# Patient Record
Sex: FEMALE | Race: WHITE | NOT HISPANIC OR LATINO | Employment: UNEMPLOYED | URBAN - METROPOLITAN AREA
[De-identification: names, ages, dates, MRNs, and addresses within clinical notes are randomized per-mention and may not be internally consistent; named-entity substitution may affect disease eponyms.]

---

## 2018-06-25 ENCOUNTER — HOSPITAL ENCOUNTER (OUTPATIENT)
Facility: MEDICAL CENTER | Age: 23
End: 2018-06-25
Payer: MEDICAID

## 2018-06-25 VITALS — HEIGHT: 65 IN | WEIGHT: 190 LBS | BODY MASS INDEX: 31.65 KG/M2

## 2018-06-25 PROCEDURE — 59025 FETAL NON-STRESS TEST: CPT | Performed by: FAMILY MEDICINE

## 2018-06-26 NOTE — PROGRESS NOTES
Pt is a 21 y/o  at 38w4d who presents with increasing contractions. No LOF, no vag bleeding, + normal fetal movement.      O:  SVE: 1/thick/high  [place]: 1 q 30 min  FHT: 140's, accels to 155, no decels    A/P  21 y/o , at 38 w 4 d, PNL wnl aside from GBS pos, O+ antibody negative, US shows posterior placenta    - BP wnl  - pt not in active labor  - can discharge home  - return precautions for increasing contractions, VB, LOF and decreased fm given

## 2018-06-26 NOTE — PROGRESS NOTES
Patient comes in with complaints of contractions over the weekend and now has pelvic pressure and pain.  Dr Grijalva called.  , edc7/5, 38.4 weeks.  Denies leaking or bleeding.  Feels fetal movement.  SVE 1/thick/high.  Patient to be sent home.  Reviewed returning for increasing contractions, leaking, bleeding, or decreased fetal movement.  Discharge instructions given.

## 2018-07-02 ENCOUNTER — HOSPITAL ENCOUNTER (INPATIENT)
Facility: MEDICAL CENTER | Age: 23
LOS: 2 days | End: 2018-07-04
Admitting: FAMILY MEDICINE
Payer: MEDICAID

## 2018-07-02 LAB
BASOPHILS # BLD AUTO: 0.6 % (ref 0–1.8)
BASOPHILS # BLD: 0.07 K/UL (ref 0–0.12)
EOSINOPHIL # BLD AUTO: 0.04 K/UL (ref 0–0.51)
EOSINOPHIL NFR BLD: 0.4 % (ref 0–6.9)
ERYTHROCYTE [DISTWIDTH] IN BLOOD BY AUTOMATED COUNT: 42.4 FL (ref 35.9–50)
HCT VFR BLD AUTO: 39.1 % (ref 37–47)
HGB BLD-MCNC: 12.6 G/DL (ref 12–16)
HOLDING TUBE BB 8507: NORMAL
IMM GRANULOCYTES # BLD AUTO: 0.04 K/UL (ref 0–0.11)
IMM GRANULOCYTES NFR BLD AUTO: 0.4 % (ref 0–0.9)
LYMPHOCYTES # BLD AUTO: 1.59 K/UL (ref 1–4.8)
LYMPHOCYTES NFR BLD: 14.2 % (ref 22–41)
MCH RBC QN AUTO: 27 PG (ref 27–33)
MCHC RBC AUTO-ENTMCNC: 32.2 G/DL (ref 33.6–35)
MCV RBC AUTO: 83.9 FL (ref 81.4–97.8)
MONOCYTES # BLD AUTO: 0.66 K/UL (ref 0–0.85)
MONOCYTES NFR BLD AUTO: 5.9 % (ref 0–13.4)
NEUTROPHILS # BLD AUTO: 8.77 K/UL (ref 2–7.15)
NEUTROPHILS NFR BLD: 78.5 % (ref 44–72)
NRBC # BLD AUTO: 0 K/UL
NRBC BLD-RTO: 0 /100 WBC
PLATELET # BLD AUTO: 177 K/UL (ref 164–446)
PMV BLD AUTO: 12.5 FL (ref 9–12.9)
RBC # BLD AUTO: 4.66 M/UL (ref 4.2–5.4)
WBC # BLD AUTO: 11.2 K/UL (ref 4.8–10.8)

## 2018-07-02 PROCEDURE — A9270 NON-COVERED ITEM OR SERVICE: HCPCS | Performed by: FAMILY MEDICINE

## 2018-07-02 PROCEDURE — 36415 COLL VENOUS BLD VENIPUNCTURE: CPT

## 2018-07-02 PROCEDURE — 304965 HCHG RECOVERY SERVICES

## 2018-07-02 PROCEDURE — 85025 COMPLETE CBC W/AUTO DIFF WBC: CPT

## 2018-07-02 PROCEDURE — 700105 HCHG RX REV CODE 258

## 2018-07-02 PROCEDURE — 700102 HCHG RX REV CODE 250 W/ 637 OVERRIDE(OP): Performed by: FAMILY MEDICINE

## 2018-07-02 PROCEDURE — 700111 HCHG RX REV CODE 636 W/ 250 OVERRIDE (IP)

## 2018-07-02 PROCEDURE — 700111 HCHG RX REV CODE 636 W/ 250 OVERRIDE (IP): Performed by: FAMILY MEDICINE

## 2018-07-02 PROCEDURE — 0KQM0ZZ REPAIR PERINEUM MUSCLE, OPEN APPROACH: ICD-10-PCS | Performed by: FAMILY MEDICINE

## 2018-07-02 PROCEDURE — 770002 HCHG ROOM/CARE - OB PRIVATE (112)

## 2018-07-02 PROCEDURE — 10907ZC DRAINAGE OF AMNIOTIC FLUID, THERAPEUTIC FROM PRODUCTS OF CONCEPTION, VIA NATURAL OR ARTIFICIAL OPENING: ICD-10-PCS | Performed by: FAMILY MEDICINE

## 2018-07-02 PROCEDURE — 59409 OBSTETRICAL CARE: CPT

## 2018-07-02 RX ORDER — MISOPROSTOL 200 UG/1
800 TABLET ORAL
Status: DISCONTINUED | OUTPATIENT
Start: 2018-07-02 | End: 2018-07-02 | Stop reason: HOSPADM

## 2018-07-02 RX ORDER — DOCUSATE SODIUM 100 MG/1
100 CAPSULE, LIQUID FILLED ORAL 2 TIMES DAILY PRN
Status: DISCONTINUED | OUTPATIENT
Start: 2018-07-02 | End: 2018-07-04 | Stop reason: HOSPADM

## 2018-07-02 RX ORDER — HYDROCODONE BITARTRATE AND ACETAMINOPHEN 5; 325 MG/1; MG/1
1 TABLET ORAL EVERY 4 HOURS PRN
Status: DISCONTINUED | OUTPATIENT
Start: 2018-07-02 | End: 2018-07-02

## 2018-07-02 RX ORDER — ACETAMINOPHEN 325 MG/1
325 TABLET ORAL EVERY 4 HOURS PRN
Status: DISCONTINUED | OUTPATIENT
Start: 2018-07-02 | End: 2018-07-04 | Stop reason: HOSPADM

## 2018-07-02 RX ORDER — HYDROCODONE BITARTRATE AND ACETAMINOPHEN 5; 325 MG/1; MG/1
2 TABLET ORAL EVERY 4 HOURS PRN
Status: DISCONTINUED | OUTPATIENT
Start: 2018-07-02 | End: 2018-07-04 | Stop reason: HOSPADM

## 2018-07-02 RX ORDER — HYDROCODONE BITARTRATE AND ACETAMINOPHEN 5; 325 MG/1; MG/1
1 TABLET ORAL EVERY 4 HOURS PRN
Status: DISCONTINUED | OUTPATIENT
Start: 2018-07-02 | End: 2018-07-04 | Stop reason: HOSPADM

## 2018-07-02 RX ORDER — HYDROXYZINE 50 MG/1
50 TABLET, FILM COATED ORAL EVERY 6 HOURS PRN
Status: DISCONTINUED | OUTPATIENT
Start: 2018-07-02 | End: 2018-07-02 | Stop reason: HOSPADM

## 2018-07-02 RX ORDER — ROPIVACAINE HYDROCHLORIDE 2 MG/ML
INJECTION, SOLUTION EPIDURAL; INFILTRATION; PERINEURAL
Status: COMPLETED
Start: 2018-07-02 | End: 2018-07-02

## 2018-07-02 RX ORDER — IBUPROFEN 600 MG/1
600 TABLET ORAL EVERY 6 HOURS PRN
Status: DISCONTINUED | OUTPATIENT
Start: 2018-07-02 | End: 2018-07-02

## 2018-07-02 RX ORDER — ONDANSETRON 2 MG/ML
4 INJECTION INTRAMUSCULAR; INTRAVENOUS EVERY 6 HOURS PRN
Status: DISCONTINUED | OUTPATIENT
Start: 2018-07-02 | End: 2018-07-04 | Stop reason: HOSPADM

## 2018-07-02 RX ORDER — FERROUS GLUCONATE 324(38)MG
324 TABLET ORAL
Status: ON HOLD | COMMUNITY
End: 2020-09-24

## 2018-07-02 RX ORDER — ACETAMINOPHEN 325 MG/1
325 TABLET ORAL EVERY 4 HOURS PRN
Status: DISCONTINUED | OUTPATIENT
Start: 2018-07-02 | End: 2018-07-02

## 2018-07-02 RX ORDER — ONDANSETRON 4 MG/1
4 TABLET, ORALLY DISINTEGRATING ORAL EVERY 6 HOURS PRN
Status: DISCONTINUED | OUTPATIENT
Start: 2018-07-02 | End: 2018-07-04 | Stop reason: HOSPADM

## 2018-07-02 RX ORDER — LIDOCAINE HYDROCHLORIDE 10 MG/ML
INJECTION, SOLUTION EPIDURAL; INFILTRATION; INTRACAUDAL; PERINEURAL
Status: COMPLETED
Start: 2018-07-02 | End: 2018-07-02

## 2018-07-02 RX ORDER — SODIUM CHLORIDE, SODIUM LACTATE, POTASSIUM CHLORIDE, CALCIUM CHLORIDE 600; 310; 30; 20 MG/100ML; MG/100ML; MG/100ML; MG/100ML
INJECTION, SOLUTION INTRAVENOUS
Status: COMPLETED
Start: 2018-07-02 | End: 2018-07-02

## 2018-07-02 RX ORDER — SODIUM CHLORIDE, SODIUM LACTATE, POTASSIUM CHLORIDE, CALCIUM CHLORIDE 600; 310; 30; 20 MG/100ML; MG/100ML; MG/100ML; MG/100ML
INJECTION, SOLUTION INTRAVENOUS CONTINUOUS
Status: DISCONTINUED | OUTPATIENT
Start: 2018-07-02 | End: 2018-07-02 | Stop reason: HOSPADM

## 2018-07-02 RX ORDER — ONDANSETRON 2 MG/ML
4 INJECTION INTRAMUSCULAR; INTRAVENOUS EVERY 6 HOURS PRN
Status: DISCONTINUED | OUTPATIENT
Start: 2018-07-02 | End: 2018-07-02

## 2018-07-02 RX ORDER — ALUMINA, MAGNESIA, AND SIMETHICONE 2400; 2400; 240 MG/30ML; MG/30ML; MG/30ML
30 SUSPENSION ORAL EVERY 6 HOURS PRN
Status: DISCONTINUED | OUTPATIENT
Start: 2018-07-02 | End: 2018-07-02 | Stop reason: HOSPADM

## 2018-07-02 RX ORDER — IBUPROFEN 600 MG/1
600 TABLET ORAL EVERY 6 HOURS PRN
Status: DISCONTINUED | OUTPATIENT
Start: 2018-07-02 | End: 2018-07-04 | Stop reason: HOSPADM

## 2018-07-02 RX ORDER — MAG HYDROX/ALUMINUM HYD/SIMETH 400-400-40
1 SUSPENSION, ORAL (FINAL DOSE FORM) ORAL
Status: DISCONTINUED | OUTPATIENT
Start: 2018-07-02 | End: 2018-07-04 | Stop reason: HOSPADM

## 2018-07-02 RX ORDER — BISACODYL 10 MG
10 SUPPOSITORY, RECTAL RECTAL PRN
Status: DISCONTINUED | OUTPATIENT
Start: 2018-07-02 | End: 2018-07-04 | Stop reason: HOSPADM

## 2018-07-02 RX ORDER — VITAMIN A ACETATE, BETA CAROTENE, ASCORBIC ACID, CHOLECALCIFEROL, .ALPHA.-TOCOPHEROL ACETATE, DL-, THIAMINE MONONITRATE, RIBOFLAVIN, NIACINAMIDE, PYRIDOXINE HYDROCHLORIDE, FOLIC ACID, CYANOCOBALAMIN, CALCIUM CARBONATE, FERROUS FUMARATE, ZINC OXIDE, CUPRIC OXIDE 3080; 12; 120; 400; 1; 1.84; 3; 20; 22; 920; 25; 200; 27; 10; 2 [IU]/1; UG/1; MG/1; [IU]/1; MG/1; MG/1; MG/1; MG/1; MG/1; [IU]/1; MG/1; MG/1; MG/1; MG/1; MG/1
1 TABLET, FILM COATED ORAL EVERY MORNING
Status: DISCONTINUED | OUTPATIENT
Start: 2018-07-03 | End: 2018-07-04 | Stop reason: HOSPADM

## 2018-07-02 RX ORDER — HYDROCODONE BITARTRATE AND ACETAMINOPHEN 10; 325 MG/1; MG/1
1 TABLET ORAL EVERY 4 HOURS PRN
Status: DISCONTINUED | OUTPATIENT
Start: 2018-07-02 | End: 2018-07-02

## 2018-07-02 RX ADMIN — Medication 2000 ML/HR: at 14:18

## 2018-07-02 RX ADMIN — SODIUM CHLORIDE, SODIUM LACTATE, POTASSIUM CHLORIDE, CALCIUM CHLORIDE: 600; 310; 30; 20 INJECTION, SOLUTION INTRAVENOUS at 11:20

## 2018-07-02 RX ADMIN — IBUPROFEN 600 MG: 600 TABLET, FILM COATED ORAL at 21:13

## 2018-07-02 RX ADMIN — FENTANYL CITRATE 100 MCG: 50 INJECTION, SOLUTION INTRAMUSCULAR; INTRAVENOUS at 14:23

## 2018-07-02 RX ADMIN — SODIUM CHLORIDE, POTASSIUM CHLORIDE, SODIUM LACTATE AND CALCIUM CHLORIDE: 600; 310; 30; 20 INJECTION, SOLUTION INTRAVENOUS at 11:20

## 2018-07-02 RX ADMIN — FENTANYL CITRATE 100 MCG: 50 INJECTION, SOLUTION INTRAMUSCULAR; INTRAVENOUS at 13:13

## 2018-07-02 RX ADMIN — LIDOCAINE HYDROCHLORIDE 30 ML: 10 INJECTION, SOLUTION EPIDURAL; INFILTRATION; INTRACAUDAL; PERINEURAL at 14:33

## 2018-07-02 RX ADMIN — HYDROCODONE BITARTRATE AND ACETAMINOPHEN 1 TABLET: 5; 325 TABLET ORAL at 19:40

## 2018-07-02 RX ADMIN — HYDROCODONE BITARTRATE AND ACETAMINOPHEN 1 TABLET: 10; 325 TABLET ORAL at 14:56

## 2018-07-02 RX ADMIN — Medication 125 ML/HR: at 15:36

## 2018-07-02 RX ADMIN — FENTANYL CITRATE 100 MCG: 50 INJECTION, SOLUTION INTRAMUSCULAR; INTRAVENOUS at 11:48

## 2018-07-02 RX ADMIN — IBUPROFEN 600 MG: 600 TABLET, FILM COATED ORAL at 14:54

## 2018-07-02 ASSESSMENT — PATIENT HEALTH QUESTIONNAIRE - PHQ9
1. LITTLE INTEREST OR PLEASURE IN DOING THINGS: NOT AT ALL
SUM OF ALL RESPONSES TO PHQ9 QUESTIONS 1 AND 2: 0
1. LITTLE INTEREST OR PLEASURE IN DOING THINGS: NOT AT ALL
2. FEELING DOWN, DEPRESSED, IRRITABLE, OR HOPELESS: NOT AT ALL
SUM OF ALL RESPONSES TO PHQ9 QUESTIONS 1 AND 2: 0

## 2018-07-02 ASSESSMENT — COPD QUESTIONNAIRES
COPD SCREENING SCORE: 0
DO YOU EVER COUGH UP ANY MUCUS OR PHLEGM?: NO/ONLY WITH OCCASIONAL COLDS OR INFECTIONS
HAVE YOU SMOKED AT LEAST 100 CIGARETTES IN YOUR ENTIRE LIFE: NO/DON'T KNOW
IN THE PAST 12 MONTHS DO YOU DO LESS THAN YOU USED TO BECAUSE OF YOUR BREATHING PROBLEMS: DISAGREE/UNSURE
DURING THE PAST 4 WEEKS HOW MUCH DID YOU FEEL SHORT OF BREATH: NONE/LITTLE OF THE TIME

## 2018-07-02 ASSESSMENT — PAIN SCALES - GENERAL
PAINLEVEL_OUTOF10: 8
PAINLEVEL_OUTOF10: 5
PAINLEVEL_OUTOF10: 5
PAINLEVEL_OUTOF10: 8
PAINLEVEL_OUTOF10: 7

## 2018-07-02 ASSESSMENT — LIFESTYLE VARIABLES
ALCOHOL_USE: NO
EVER_SMOKED: NEVER

## 2018-07-02 NOTE — PROGRESS NOTES
Pt arrived via wheel chair with belongings to room S323.  Report received from Srinivasan.  Pt oriented to room, call light & infant security.  Assessment done.  Fundus firm, lochia light.  Vital signs stable.  IV patent on pump.  Pt denies complaints of pain, see MAR.  Pt aware of dangers related to sleeping with infant, reviewed plan of care with pt & encouraged to call with needs or prior to ambulating.  Call light in place.

## 2018-07-02 NOTE — PROGRESS NOTES
"S:  Patient feeling uncomfortable with contractions.  Currently getting fentanyl pushes with good effect.  Reporting 7/10 pain between contractions and 10/10 pain with contractions.  No desire for epidural at this time.    O:  Vitals:    18 0948 18 1107   BP: 117/73 116/69   Pulse: 86 90   Resp:  18   Temp: 36.9 °C (98.5 °F) 36.9 °C (98.4 °F)   TempSrc: Temporal Temporal   Weight: 88.5 kg (195 lb) 84.8 kg (187 lb)   Height: 1.651 m (5' 5\") 1.651 m (5' 5\")   FHT: 140s +accels, -decels, moderate variability  [place]: Q5 minutes, not clearly picked up on monitor  SVE: 7/100%/0    A/P:   at 39w4d weeks in active labor here for   -AROM @1:45pm--clear  -GBS unknown--GBS PCR ordered and pending.  Low risk  -Epidural or Fentanyl PRN  -Anticipating         "

## 2018-07-02 NOTE — DELIVERY NOTE
[facility] SPONTANEOUS VAGINAL DELIVERY PRODEDURE NOTE    PATIENT ID:  NAME:  Gayle Washington  MRN:               [id number]  YOB: 1995    On 2018  at 14:13, this 22 y.o., now  39w4d , GBS unknown female delivered via  under no anesthesia a viable female infant weighing 6 lbs and 11 oz with APGAR scores of 8 and 9 at one and five minutes. There was a single nuchal cord which was reduced and infant was bulb suctioned at delivery. Delayed cord clamping was allowed and then cord was doubly clamped, cut and infant handed to RN in attendance. An intact placenta was delivered spontaneously at 14:18 with 3 vessel cord. Upon vaginal exam, there was a second degree perineal laceration which was repaired using 3.0 vicryl in the usual sterile fashion. Estimated blood loss was 300cc. Patient will be transferred to postpartum in stable condition and infant to  nursery.    Delivery attended by Dr. Cabrales who was present for the entire delivery.

## 2018-07-02 NOTE — H&P
[facility] LABOR AND DELIVERY HISTORY AND PHYSICAL    PATIENT ID:  NAME:  Gayle Washington  MRN:               [id number]  YOB: 1995    CC:  Latent Labor    HPI:  Gayle Washington is a 22 y.o. female  at 39w4d by a 9 week ultrasound consistent w/ LMP. No LMP recorded. Patient is pregnant.. Estimated Date of Delivery: 18  Patient presents complaining of  uterine contractions, with no loss of fluid.  Good fetal movement.  No vaginal bleeding.  Pregnancy was uncomplicated.     ROS: Patient denies any fever chills, nausea, vomiting, headache, chest pain, shortness of breath, or dysuria or unusual swelling of hands or feet.     Prenatal Care: Obtained at [facility] with Dr. Gautam. Weight gain was appropriate.     Prenatal Labs:   HepBsAg: Neg HIV: Neg Rubella: Imm   RPR: Neg PAP: Uknown GBS: Unkown   GC/CT: Unknown O+/ Ab Neg Quad Screen: Unknown   No results for input(s): WBC, RBC, HEMOGLOBIN, HEMATOCRIT, MCV, MCH, RDW, PLATELETCT, MPV, NEUTSPOLYS, LYMPHOCYTES, MONOCYTES, EOSINOPHILS, BASOPHILS, RBCMORPHOLO in the last 72 hours.  No results for input(s): SODIUM, POTASSIUM, CHLORIDE, CO2, GLUCOSE, BUN, CPKTOTAL in the last 72 hours.       IMAGINwk US  Anatomy scan     US shows posterior placenta    POB Hx:  OB History    Para Term  AB Living   2 1       1   SAB TAB Ectopic Molar Multiple Live Births           1 1      # Outcome Date GA Lbr Rito/2nd Weight Sex Delivery Anes PTL Lv   2 Current            1A Term 07/12/15 39w6d  4.03 kg (8 lb 14.2 oz) M Vag-Spont EPI N NAZ   1B                    PMH/Problem List:    No past medical history on file.  There are no active problems to display for this patient.      Current Outpatient Medications:  No current facility-administered medications on file prior to encounter.      Current Outpatient Prescriptions on File Prior to Encounter   Medication Sig Dispense Refill   • Prenatal MV-Min-Fe Fum-FA-DHA (PRENATAL 1 PO) Take  by  "mouth.         PSH:    Past Surgical History:   Procedure Laterality Date   • MINDA BY LAPAROSCOPY  2013    Performed by Robin Dash M.D. at [facility]       Allergies:   No Known Allergies    SH:  Social History     Social History   • Marital status: Single     Spouse name: N/A   • Number of children: N/A   • Years of education: N/A     Occupational History   • Not on file.     Social History Main Topics   • Smoking status: Never Smoker   • Smokeless tobacco: Not on file   • Alcohol use No   • Drug use: No   • Sexual activity: Yes     Partners: Male      Comment: Unplanned pregnancy     Other Topics Concern   • Not on file     Social History Narrative   • No narrative on file         PHYSICAL EXAM:  Vitals:    18 0948   BP: 117/73   Pulse: 86   Temp: 36.9 °C (98.5 °F)   TempSrc: Temporal   Weight: 88.5 kg (195 lb)   Height: 1.651 m (5' 5\")     Temp (24hrs), Av.9 °C (98.5 °F), Min:36.9 °C (98.5 °F), Max:36.9 °C (98.5 °F)    General: No acute distress, resting comfortably in bed.  HEENT: normocephalic, nontraumatic, PERRLA, EOMI  Cardiovascular: Heart RRR with no murmurs, rubs or gallops. Distal Pulses 2+  Respiratory: symmetric chest expansion, lungs CTA bilaterally with no wheezes rales or  rhonci  Abdomen: gravid, nontender  Musculoskeletal: strength 5/5 in four extremities  Neuro: non focal with no numbness, tingling or changes in sensation    SVE: 5/80%/-2  [place]: Q6 minutes; EFM: 130s with accels to 140s    A/P: Intrauterine pregancy at 39w4d weeks in latent labor here for .    1. IUP at term  2. Patient is GBS unknown  1. Will order GBS   2. Pt is low risk, will not start abx at this time  3. Anticipating   4. EFW: 3100g    "

## 2018-07-02 NOTE — PROGRESS NOTES
MARITZA , EGA 39.4    Positive FM, denies LOF or VB.    Pt presenting with CO UC's Q6 mins since 5am.  She stated she was seen last week and was 1cm.    SVE /2    1000 Call to [facility] exchange.    1003 Call back from Aminta. MD will come to BS.    1010 Alcides at BS, SVE preformed.    1040 Alcides in department, will admit PT    1050 Report to Hillary,

## 2018-07-02 NOTE — PROGRESS NOTES
[id number]. Report from Frances GILBERT RN. POC discussed and resumed. Pt to S225.    1220. Dr. Gautam and Dr. Cabrales notified of unknown GBS, No new orders received.Dr. Gautam and Dr. Cabrales at the bedside.     1346. Dr. Gautam at the bedside. SVE 7/100/0, AROM clear fluid.    1413. SCD viable female, 8/9 APGARS, 2nd degree lac with repair.    1615. Pt up to void, voided. Joan care done.    1620. Pt transferred to  in stable condition. Report to Rachna DÍAZ RN. POC discussed at the bedside.

## 2018-07-03 LAB
ERYTHROCYTE [DISTWIDTH] IN BLOOD BY AUTOMATED COUNT: 41.3 FL (ref 35.9–50)
HCT VFR BLD AUTO: 31.8 % (ref 37–47)
HGB BLD-MCNC: 10.4 G/DL (ref 12–16)
MCH RBC QN AUTO: 27.4 PG (ref 27–33)
MCHC RBC AUTO-ENTMCNC: 32.7 G/DL (ref 33.6–35)
MCV RBC AUTO: 83.7 FL (ref 81.4–97.8)
PLATELET # BLD AUTO: 158 K/UL (ref 164–446)
PMV BLD AUTO: 13.1 FL (ref 9–12.9)
RBC # BLD AUTO: 3.8 M/UL (ref 4.2–5.4)
WBC # BLD AUTO: 12.1 K/UL (ref 4.8–10.8)

## 2018-07-03 PROCEDURE — A9270 NON-COVERED ITEM OR SERVICE: HCPCS | Performed by: FAMILY MEDICINE

## 2018-07-03 PROCEDURE — 85027 COMPLETE CBC AUTOMATED: CPT

## 2018-07-03 PROCEDURE — 700112 HCHG RX REV CODE 229: Performed by: FAMILY MEDICINE

## 2018-07-03 PROCEDURE — 36415 COLL VENOUS BLD VENIPUNCTURE: CPT

## 2018-07-03 PROCEDURE — 700102 HCHG RX REV CODE 250 W/ 637 OVERRIDE(OP): Performed by: FAMILY MEDICINE

## 2018-07-03 PROCEDURE — 770002 HCHG ROOM/CARE - OB PRIVATE (112)

## 2018-07-03 RX ORDER — FERROUS SULFATE 325(65) MG
325 TABLET ORAL 2 TIMES DAILY WITH MEALS
Status: DISCONTINUED | OUTPATIENT
Start: 2018-07-03 | End: 2018-07-04 | Stop reason: HOSPADM

## 2018-07-03 RX ADMIN — Medication 1 TABLET: at 07:06

## 2018-07-03 RX ADMIN — DOCUSATE SODIUM 100 MG: 100 CAPSULE ORAL at 22:28

## 2018-07-03 RX ADMIN — IBUPROFEN 600 MG: 600 TABLET, FILM COATED ORAL at 22:28

## 2018-07-03 RX ADMIN — IBUPROFEN 600 MG: 600 TABLET, FILM COATED ORAL at 07:06

## 2018-07-03 RX ADMIN — Medication 325 MG: at 18:33

## 2018-07-03 RX ADMIN — HYDROCODONE BITARTRATE AND ACETAMINOPHEN 2 TABLET: 5; 325 TABLET ORAL at 12:08

## 2018-07-03 RX ADMIN — HYDROCODONE BITARTRATE AND ACETAMINOPHEN 1 TABLET: 5; 325 TABLET ORAL at 22:28

## 2018-07-03 ASSESSMENT — PAIN SCALES - GENERAL
PAINLEVEL_OUTOF10: 6
PAINLEVEL_OUTOF10: 7
PAINLEVEL_OUTOF10: 4
PAINLEVEL_OUTOF10: 0
PAINLEVEL_OUTOF10: 2

## 2018-07-03 NOTE — CARE PLAN
Problem: Safety  Goal: Will remain free from falls  Outcome: PROGRESSING AS EXPECTED  Patient resting in bed with call light in reach. Bed in low position.     Problem: Knowledge Deficit  Goal: Knowledge of disease process/condition, treatment plan, diagnostic tests, and medications will improve  Outcome: PROGRESSING AS EXPECTED  Patient educated on PP floor. Patient verbalizes understanding of processes at this time.

## 2018-07-03 NOTE — PROGRESS NOTES
[facility] POSTPARTUM PROGRESS NOTE    PATIENT ID:  NAME:  Gayle Washington  MRN:               [id number]  YOB: 1995     22 y.o. female  at 39w5d PPD#1 s/p     Subjective: Doing well this morning.  Lochia light.  Some cramping overnight, but pain well controlled.  No SOB or leg swelling.  Breast feeding going well.  Does not desire depo shot in the hospital.      Objective:    Vitals:    18 1524 18 1630 18 0000   BP: 118/68 106/61 109/75 104/69   Pulse: 67 65 66 67   Resp:     Temp:  36.7 °C (98 °F) 36.9 °C (98.4 °F) 36.8 °C (98.3 °F)   TempSrc:       SpO2:  94% 95% 94%   Weight:       Height:         General: No acute distress, resting comfortably in bed.  HEENT: normocephalic, nontraumatic, PERRLA, EOMI  Cardiovascular: Heart RRR with no murmurs, rubs or gallops. Distal Pulses 2+  Respiratory: symmetric chest expansion, lungs CTA bilaterally with no wheezes rales or rhonci  Abdomen: soft, mildly tender, fundus firm, +BS  Genitourinary: lochia light, denies excessive vaginal bleeding  Musculoskeletal: No edema in LE  Neuro: non focal with no numbness, tingling or changes in sensation    Recent Labs      18   1110  18   0348   WBC  11.2*  12.1*   RBC  4.66  3.80*   HEMOGLOBIN  12.6  10.4*   HEMATOCRIT  39.1  31.8*   MCV  83.9  83.7   MCH  27.0  27.4   RDW  42.4  41.3   PLATELETCT  177  158*   MPV  12.5  13.1*   NEUTSPOLYS  78.50*   --    LYMPHOCYTES  14.20*   --    MONOCYTES  5.90   --    EOSINOPHILS  0.40   --    BASOPHILS  0.60   --      No results for input(s): SODIUM, POTASSIUM, CHLORIDE, CO2, GLUCOSE, BUN, CPKTOTAL in the last 72 hours.    Current Meds:   Current Facility-Administered Medications   Medication Dose Frequency Provider Last Rate Last Dose   • ondansetron (ZOFRAN ODT) dispertab 4 mg  4 mg Q6HRS PRN Meeta Mcgrath M.D.       • oxytocin (PITOCIN) infusion (for postpartum)   mL/hr Continuous Meeta Mcgrath M.D.       •  oxytocin (PITOCIN) infusion (for postpartum)   mL/hr Continuous Erinn Gautam M.D. 125 mL/hr at 18 1536 125 mL/hr at 18 1536   • ibuprofen (MOTRIN) tablet 600 mg  600 mg Q6HRS PRN Erinn Gautam M.D.   600 mg at 18 0706   • acetaminophen (TYLENOL) tablet 325 mg  325 mg Q4HRS PRN Erinn Gautam M.D.       • HYDROcodone-acetaminophen (NORCO) 5-325 MG per tablet 1 Tab  1 Tab Q4HRS PRDIEGO Gautam M.D.   1 Tab at 18 1940   • HYDROcodone-acetaminophen (NORCO) 5-325 MG per tablet 2 Tab  2 Tab Q4HRS PRN Erinn Gautam M.D.       • docusate sodium (COLACE) capsule 100 mg  100 mg BID PRN Erinn Gautam M.D.       • ondansetron (ZOFRAN) syringe/vial injection 4 mg  4 mg Q6HRS PRN Erinn Gautam M.D.       • prenatal plus vitamin (STUARTNATAL 1+1) 27-1 MG tablet 1 Tab  1 Tab QAM Erinn Gautam M.D.   1 Tab at 18 0706   • bisacodyl (DULCOLAX) suppository 10 mg  10 mg PRDIEGO Gautam M.D.       • glycerin (adult) suppository 1 Suppository  1 Suppository QDAY PRN Erinn Gautam M.D.         Last reviewed on 2018 11:13 AM by Lien Grey R.N.     Assessment:  22 y.o. female  at 39w5d PPD#1 s/p     Plan:   1. Routine post partum care  2. Patient does not desire depo shot at this time  3. Will start iron supplement  4. Disposition: Anticipate discharge tomorrow for GBS positive and no antibiotics  5. Followup: [facility] with Dr. Gautam

## 2018-07-03 NOTE — CONSULTS
Lactation visit done. Pt states she is not having any trouble with breastfeeding, denies nipple pain or tenderness. Discussed resources for breastfeeding help post d/c.  Discussed feeding frequency norms for next 3 days. Enc to call for latch check with next feeding as needed.

## 2018-07-03 NOTE — PROGRESS NOTES
1900: Patient resting in bed with call light in reach. Bed in low position. FOB at bedside.   0700: Report given to day RN. Plan of care discussed.

## 2018-07-03 NOTE — CARE PLAN
Problem: Potential for postpartum infection related to presence of episiotomy/vaginal tear and/or uterine contamination  Goal: Patient will be absent from signs and symptoms of infection  Outcome: PROGRESSING AS EXPECTED  No signs or symptoms of infection noted    Problem: Alteration in comfort related to episiotomy, vaginal repair and/or after birth pains  Goal: Patient is able to ambulate, care for self and infant  Outcome: PROGRESSING AS EXPECTED  Taking pain meds states adequate relief

## 2018-07-04 VITALS
OXYGEN SATURATION: 95 % | TEMPERATURE: 97.6 F | DIASTOLIC BLOOD PRESSURE: 67 MMHG | HEART RATE: 70 BPM | RESPIRATION RATE: 20 BRPM | BODY MASS INDEX: 31.16 KG/M2 | WEIGHT: 187 LBS | HEIGHT: 65 IN | SYSTOLIC BLOOD PRESSURE: 114 MMHG

## 2018-07-04 PROCEDURE — 700102 HCHG RX REV CODE 250 W/ 637 OVERRIDE(OP): Performed by: FAMILY MEDICINE

## 2018-07-04 PROCEDURE — 700112 HCHG RX REV CODE 229: Performed by: FAMILY MEDICINE

## 2018-07-04 PROCEDURE — A9270 NON-COVERED ITEM OR SERVICE: HCPCS | Performed by: FAMILY MEDICINE

## 2018-07-04 RX ORDER — IBUPROFEN 600 MG/1
600 TABLET ORAL EVERY 6 HOURS PRN
Qty: 30 TAB | Refills: 2 | Status: ON HOLD | OUTPATIENT
Start: 2018-07-04 | End: 2020-09-24

## 2018-07-04 RX ORDER — HYDROCODONE BITARTRATE AND ACETAMINOPHEN 5; 325 MG/1; MG/1
1 TABLET ORAL EVERY 4 HOURS PRN
Qty: 15 TAB | Refills: 0 | Status: SHIPPED | OUTPATIENT
Start: 2018-07-04 | End: 2018-07-07

## 2018-07-04 RX ADMIN — DOCUSATE SODIUM 100 MG: 100 CAPSULE ORAL at 08:12

## 2018-07-04 RX ADMIN — IBUPROFEN 600 MG: 600 TABLET, FILM COATED ORAL at 06:07

## 2018-07-04 RX ADMIN — Medication 325 MG: at 08:12

## 2018-07-04 RX ADMIN — HYDROCODONE BITARTRATE AND ACETAMINOPHEN 1 TABLET: 5; 325 TABLET ORAL at 06:07

## 2018-07-04 RX ADMIN — Medication 1 TABLET: at 08:12

## 2018-07-04 ASSESSMENT — PAIN SCALES - GENERAL
PAINLEVEL_OUTOF10: 0
PAINLEVEL_OUTOF10: 0
PAINLEVEL_OUTOF10: 4

## 2018-07-04 NOTE — CARE PLAN
Problem: Altered physiologic condition related to immediate post-delivery state and potential for bleeding/hemorrhage  Goal: Patient physiologically stable as evidenced by normal lochia, palpable uterine involution and vital signs within normal limits  Outcome: PROGRESSING AS EXPECTED  Fundus firm, lochia light, VSS.    Problem: Alteration in comfort related to episiotomy, vaginal repair and/or after birth pains  Goal: Patient is able to ambulate, care for self and infant  Outcome: PROGRESSING AS EXPECTED  Pt ambulating and caring for self and infant independently, taking po pain medication as needed.

## 2018-07-04 NOTE — PROGRESS NOTES
Pt assessment complete, wnl.  Pt ambulating and voiding without difficulty.  Bonding well with infant, breast feeding independently.  Plan of care discussed with patient for the night.  Pt will call RN if she needs pain medication this shift.  Questions answered.  FOB at bedside and supportive.  Encouraged to call with needs, call light in reach.

## 2018-07-04 NOTE — PROGRESS NOTES
Wheeled out by RN for discharge.  Baby in Car Seat. Accompanied by FOB  And discharge to home with all belongings.

## 2018-07-04 NOTE — DISCHARGE INSTRUCTIONS
POSTPARTUM DISCHARGE INSTRUCTIONS FOR MOM    YOB: 1995   Age: 22 y.o.               Admit Date: 2018     Discharge Date: 2018  Attending Doctor:  Jill Zaragoza                  Allergies:  Patient has no known allergies.    Discharged to home by car. Discharged via wheelchair, hospital escort: Yes.  Special equipment needed: Not Applicable  Belongings with: Personal  Be sure to schedule a follow-up appointment with your primary care doctor or any specialists as instructed.     Discharge Plan:   Diet Plan: Discussed  Activity Level: Discussed  Confirmed Follow up Appointment: Patient to Call and Schedule Appointment  Confirmed Symptoms Management: Discussed  Medication Reconciliation Updated: No (Comments)  Influenza Vaccine Indication: Not indicated: Previously immunized this influenza season and > 8 years of age    REASONS TO CALL YOUR OBSTETRICIAN:  1.   Persistent fever or shaking chills (Temperature higher than 100.4)  2.   Heavy bleeding (soaking more than 1 pad per hour); Passing clots  3.   Foul odor from vagina  4.   Mastitis (Breast infection; breast pain, chills, fever, redness)  5.   Urinary pain, burning or frequency  6.   Episiotomy infection  7.   Abdominal incision infection  8.   Severe depression longer than 24 hours    HAND WASHING  · Prior to handling the baby.  · Before breastfeeding or bottle feeding baby.  · After using the bathroom or changing the baby's diaper.    WOUND CARE  Ask your physician for additional care instructions.  In general:    ·  Incision:      · Keep clean and dry.    · Do NOT lift anything heavier than your baby for up to 6 weeks.    · There should not be any opening or pus.      VAGINAL CARE  · Nothing inside vagina for 6 weeks: no sexual intercourse, tampons or douching.  · Bleeding may continue for 2-4 weeks.  Amount may vary.    · Call your physician for heavy bleeding which means soaking more than 1 pad per hour    BIRTH CONTROL  · It is  "possible to become pregnant at any time after delivery and while breastfeeding.  · Plan to discuss a method of birth control with your physician at your follow up visit. visit.    DIET AND ELIMINATION  · Eating more fiber (bran cereal, fruits, and vegetables) and drinking plenty of fluids will help to avoid constipation.  · Urinary frequency after childbirth is normal.    POSTPARTUM BLUES  During the first few days after birth, you may experience a sense of the \"blues\" which may include impatience, irritability or even crying.  These feeling come and go quickly.  However, as many as 1 in 10 women experience emotional symptoms known as postpartum depression.    Postpartum depression:  May start as early as the second or third day after delivery or take several weeks or months to develop.  Symptoms of \"blues\" are present, but are more intense:  Crying spells; loss of appetite; feelings of hopelessness or loss of control; fear of touching the baby; over concern or no concern at all about the baby; little or no concern about your own appearance/caring for yourself; and/or inability to sleep or excessive sleeping.  Contact your physician if you are experiencing any of these symptoms.    Crisis Hotline:  · [place] Crisis Hotline:  7-344-UFXZYPL  Or 1-453.159.9947  · [place] Crisis Hotline:  1-347.787.3690  Or 975-153-6810    PREVENTING SHAKEN BABY:  If you are angry or stressed, PUT THE BABY IN THE CRIB, step away, take some deep breaths, and wait until you are calm to care for the baby.  DO NOT SHAKE THE BABY.  You are not alone, call a supporter for help.    · Crisis Call Center 24/7 crisis line 263-807-4705 or 1-754.563.2017  · You can also text them, text \"ANSWER\" to 118762    QUIT SMOKING/TOBACCO USE:  I understand the use of any tobacco products increases my chance of suffering from future heart disease and could cause other illnesses which may shorten my life. Quitting the use of tobacco products is the single most " important thing I can do to improve my health. For further information on smoking / tobacco cessation call a Toll Free Quit Line at 1-576.544.6507 (*National [employer] [place]) or 1-255.192.2565 (American Lung Association) or you can access the web based program at www.lungusa.org.    · [place] Tobacco Users Help Line:  (226) 435-2147       Toll Free: 1-388.404.9361  · Quit Tobacco Program [facility] (060)190-7679    DEPRESSION / SUICIDE RISK:  As you are discharged from this [facility], it is important to learn how to keep safe from harming yourself.    Recognize the warning signs:  · Abrupt changes in personality, positive or negative- including increase in energy   · Giving away possessions  · Change in eating patterns- significant weight changes-  positive or negative  · Change in sleeping patterns- unable to sleep or sleeping all the time   · Unwillingness or inability to communicate  · Depression  · Unusual sadness, discouragement and loneliness  · Talk of wanting to die  · Neglect of personal appearance   · Rebelliousness- reckless behavior  · Withdrawal from people/activities they love  · Confusion- inability to concentrate     If you or a loved one observes any of these behaviors or has concerns about self-harm, here's what you can do:  · Talk about it- your feelings and reasons for harming yourself  · Remove any means that you might use to hurt yourself (examples: pills, rope, extension cords, firearm)  · Get professional help from the community (Mental Health, Substance Abuse, psychological counseling)  · Do not be alone:Call your Safe Contact- someone whom you trust who will be there for you.  · Call your local CRISIS HOTLINE 785-6166 or 296-396-7930  · Call your local Children's Mobile Crisis Response Team [place] (208) 643-7145 or www.IntroMaps  · Call the toll free National Suicide Prevention Hotlines   · National Suicide Prevention Lifeline 734-952-COCN  (8364)  · [facility] 800-SUICIDE (113-2313)    DISCHARGE SURVEY:  Thank you for choosing [facility].  We hope we provided you with very good care.  You may be receiving a survey in the mail.  Please fill it out.  Your opinion is valuable to us.    ADDITIONAL EDUCATIONAL MATERIALS GIVEN TO PATIENT:        My signature on this form indicates that:  1.  I have reviewed and understand the above information  2.  My questions regarding this information have been answered to my satisfaction.  3.  I have formulated a plan with my discharge nurse to obtain my prescribed medication for home.

## 2018-07-04 NOTE — CONSULTS
Mother reports right nipple tenderness with breastfeeding. Nipple intact. Baby to rt breast, shallow latch, mother unlatches and repositions nipple to nose and deep, more comfortable  latch obtained.  Reviewed post d/c breastfeeding support resources.  Enc to feed on hunger cue without time restrictions.

## 2018-07-04 NOTE — PROGRESS NOTES
0945- Discharge education discussed with patient.  Patient verbalized understanding.  Prescriptions handed to patient.  Patient verbalized understanding of follow up appointments for herself and infant.

## 2018-07-04 NOTE — DISCHARGE SUMMARY
[facility]  NORMAL SPONTANEOUS VAGINAL DISCHARGE SUMMARY    PATIENT ID:  NAME:  Gayle Washington  MRN:               [id number]  YOB: 1995    DATE OF ADMISSION: 2018    DATE OF DISCHARGE: 2018     ADMITTING DIAGNOSIS:  1. Intrauterine pregnancy at 39w6d.  2.GBS positive, no antibiotics    DISCHARGE DIAGNOSIS:  1. s/p       HOSPITAL COURSE: This is a 22 y.o. year old female admitted at 39w6d who presented with contractions, no LOF, no vaginal bleeding, good FM and in active labor. Pt was 5 cm dilated, 80% effaced and at  -2 station on sterile vaginal exam. Pregnancy was uncomplicated. The patient had a good labor pattern after admission and proceeded to deliver a viable female infant weighing  6 lbs and 11 oz. Infants Apgars scores were 8 and 9 at one and five minutes. The patients postpartum course was uncomplicated and she was discharged home in stable condition on postpartum day #2.    PROCEDURES PERFORMED: Normal spontaneous vaginal delivery over 2nd degree perineal laceration  which was repaired using 3-0 vicryl in the usual sterile fashion.    COMPLICATIONS: None    DIET: Regular    ACTIVITY: No intercourse and nothing inserted into the vagina for 5 weeks.    MEDICATIONS:  Norco  Ibuprofen  Iron Sulfate      FOLLOWUP:  1)  at [facility] in 5 weeks  2) Return to the hospital if copious vaginal bleeding or foul smelling discharge is noted

## 2020-04-09 LAB
ABO GROUP BLD: NORMAL
BLD GP AB SCN SERPL QL: NEGATIVE
HBV SURFACE AG SERPL QL IA: NEGATIVE
HIV 1+2 AB+HIV1 P24 AG SERPL QL IA: NON REACTIVE
RH BLD: POSITIVE
RUBV IGG SERPL IA-ACNC: NORMAL
TREPONEMA PALLIDUM IGG+IGM AB [PRESENCE] IN SERUM OR PLASMA BY IMMUNOASSAY: NON REACTIVE

## 2020-09-01 LAB — STREP GP B DNA PCR: POSITIVE

## 2020-09-23 ENCOUNTER — HOSPITAL ENCOUNTER (OUTPATIENT)
Dept: OBGYN | Facility: MEDICAL CENTER | Age: 25
End: 2020-09-23
Attending: OBSTETRICS & GYNECOLOGY
Payer: MEDICAID

## 2020-09-23 LAB
COVID ORDER STATUS COVID19: NORMAL
SARS-COV-2 RNA RESP QL NAA+PROBE: NOTDETECTED
SPECIMEN SOURCE: NORMAL

## 2020-09-23 PROCEDURE — U0003 INFECTIOUS AGENT DETECTION BY NUCLEIC ACID (DNA OR RNA); SEVERE ACUTE RESPIRATORY SYNDROME CORONAVIRUS 2 (SARS-COV-2) (CORONAVIRUS DISEASE [COVID-19]), AMPLIFIED PROBE TECHNIQUE, MAKING USE OF HIGH THROUGHPUT TECHNOLOGIES AS DESCRIBED BY CMS-2020-01-R: HCPCS

## 2020-09-23 PROCEDURE — C9803 HOPD COVID-19 SPEC COLLECT: HCPCS | Performed by: OBSTETRICS & GYNECOLOGY

## 2020-09-24 ENCOUNTER — ANESTHESIA (OUTPATIENT)
Dept: ANESTHESIOLOGY | Facility: MEDICAL CENTER | Age: 25
End: 2020-09-24
Payer: MEDICAID

## 2020-09-24 ENCOUNTER — ANESTHESIA EVENT (OUTPATIENT)
Dept: ANESTHESIOLOGY | Facility: MEDICAL CENTER | Age: 25
End: 2020-09-24
Payer: MEDICAID

## 2020-09-24 ENCOUNTER — HOSPITAL ENCOUNTER (INPATIENT)
Facility: MEDICAL CENTER | Age: 25
LOS: 3 days | End: 2020-09-27
Attending: OBSTETRICS & GYNECOLOGY | Admitting: OBSTETRICS & GYNECOLOGY
Payer: MEDICAID

## 2020-09-24 LAB
A1 MICROGLOB PLACENTAL VAG QL: POSITIVE
BASOPHILS # BLD AUTO: 0.4 % (ref 0–1.8)
BASOPHILS # BLD: 0.04 K/UL (ref 0–0.12)
EOSINOPHIL # BLD AUTO: 0.04 K/UL (ref 0–0.51)
EOSINOPHIL NFR BLD: 0.4 % (ref 0–6.9)
ERYTHROCYTE [DISTWIDTH] IN BLOOD BY AUTOMATED COUNT: 41 FL (ref 35.9–50)
HCT VFR BLD AUTO: 34.8 % (ref 37–47)
HGB BLD-MCNC: 11.1 G/DL (ref 12–16)
HOLDING TUBE BB 8507: NORMAL
IMM GRANULOCYTES # BLD AUTO: 0.04 K/UL (ref 0–0.11)
IMM GRANULOCYTES NFR BLD AUTO: 0.4 % (ref 0–0.9)
LYMPHOCYTES # BLD AUTO: 1.72 K/UL (ref 1–4.8)
LYMPHOCYTES NFR BLD: 15.2 % (ref 22–41)
MCH RBC QN AUTO: 25.9 PG (ref 27–33)
MCHC RBC AUTO-ENTMCNC: 31.9 G/DL (ref 33.6–35)
MCV RBC AUTO: 81.1 FL (ref 81.4–97.8)
MONOCYTES # BLD AUTO: 0.58 K/UL (ref 0–0.85)
MONOCYTES NFR BLD AUTO: 5.1 % (ref 0–13.4)
NEUTROPHILS # BLD AUTO: 8.87 K/UL (ref 2–7.15)
NEUTROPHILS NFR BLD: 78.5 % (ref 44–72)
NRBC # BLD AUTO: 0 K/UL
NRBC BLD-RTO: 0 /100 WBC
PLATELET # BLD AUTO: 231 K/UL (ref 164–446)
PMV BLD AUTO: 12.6 FL (ref 9–12.9)
RBC # BLD AUTO: 4.29 M/UL (ref 4.2–5.4)
WBC # BLD AUTO: 11.3 K/UL (ref 4.8–10.8)

## 2020-09-24 PROCEDURE — 36415 COLL VENOUS BLD VENIPUNCTURE: CPT

## 2020-09-24 PROCEDURE — 3E033VJ INTRODUCTION OF OTHER HORMONE INTO PERIPHERAL VEIN, PERCUTANEOUS APPROACH: ICD-10-PCS | Performed by: OBSTETRICS & GYNECOLOGY

## 2020-09-24 PROCEDURE — 84112 EVAL AMNIOTIC FLUID PROTEIN: CPT

## 2020-09-24 PROCEDURE — 700105 HCHG RX REV CODE 258: Performed by: OBSTETRICS & GYNECOLOGY

## 2020-09-24 PROCEDURE — 700105 HCHG RX REV CODE 258

## 2020-09-24 PROCEDURE — 700111 HCHG RX REV CODE 636 W/ 250 OVERRIDE (IP): Performed by: OBSTETRICS & GYNECOLOGY

## 2020-09-24 PROCEDURE — 700111 HCHG RX REV CODE 636 W/ 250 OVERRIDE (IP): Performed by: ANESTHESIOLOGY

## 2020-09-24 PROCEDURE — 85025 COMPLETE CBC W/AUTO DIFF WBC: CPT

## 2020-09-24 PROCEDURE — 770002 HCHG ROOM/CARE - OB PRIVATE (112)

## 2020-09-24 PROCEDURE — 700111 HCHG RX REV CODE 636 W/ 250 OVERRIDE (IP)

## 2020-09-24 RX ORDER — HYDROXYZINE 50 MG/1
50 TABLET, FILM COATED ORAL EVERY 6 HOURS PRN
Status: DISCONTINUED | OUTPATIENT
Start: 2020-09-24 | End: 2020-09-25 | Stop reason: HOSPADM

## 2020-09-24 RX ORDER — ROPIVACAINE HYDROCHLORIDE 2 MG/ML
INJECTION, SOLUTION EPIDURAL; INFILTRATION; PERINEURAL
Status: COMPLETED
Start: 2020-09-24 | End: 2020-09-24

## 2020-09-24 RX ORDER — MISOPROSTOL 200 UG/1
800 TABLET ORAL
Status: DISCONTINUED | OUTPATIENT
Start: 2020-09-24 | End: 2020-09-25 | Stop reason: HOSPADM

## 2020-09-24 RX ORDER — METHYLERGONOVINE MALEATE 0.2 MG/ML
0.2 INJECTION INTRAVENOUS
Status: DISCONTINUED | OUTPATIENT
Start: 2020-09-24 | End: 2020-09-25 | Stop reason: HOSPADM

## 2020-09-24 RX ORDER — SODIUM CHLORIDE, SODIUM LACTATE, POTASSIUM CHLORIDE, AND CALCIUM CHLORIDE .6; .31; .03; .02 G/100ML; G/100ML; G/100ML; G/100ML
1000 INJECTION, SOLUTION INTRAVENOUS
Status: DISCONTINUED | OUTPATIENT
Start: 2020-09-24 | End: 2020-09-25 | Stop reason: HOSPADM

## 2020-09-24 RX ORDER — BUPIVACAINE HYDROCHLORIDE 2.5 MG/ML
INJECTION, SOLUTION EPIDURAL; INFILTRATION; INTRACAUDAL
Status: COMPLETED
Start: 2020-09-24 | End: 2020-09-24

## 2020-09-24 RX ORDER — ROPIVACAINE HYDROCHLORIDE 2 MG/ML
INJECTION, SOLUTION EPIDURAL; INFILTRATION; PERINEURAL CONTINUOUS
Status: DISCONTINUED | OUTPATIENT
Start: 2020-09-24 | End: 2020-09-25

## 2020-09-24 RX ORDER — PENICILLIN G POTASSIUM 5000000 [IU]/1
5 INJECTION, POWDER, FOR SOLUTION INTRAMUSCULAR; INTRAVENOUS ONCE
Status: COMPLETED | OUTPATIENT
Start: 2020-09-24 | End: 2020-09-24

## 2020-09-24 RX ORDER — SODIUM CHLORIDE, SODIUM LACTATE, POTASSIUM CHLORIDE, CALCIUM CHLORIDE 600; 310; 30; 20 MG/100ML; MG/100ML; MG/100ML; MG/100ML
INJECTION, SOLUTION INTRAVENOUS
Status: COMPLETED
Start: 2020-09-24 | End: 2020-09-24

## 2020-09-24 RX ORDER — SODIUM CHLORIDE, SODIUM LACTATE, POTASSIUM CHLORIDE, CALCIUM CHLORIDE 600; 310; 30; 20 MG/100ML; MG/100ML; MG/100ML; MG/100ML
INJECTION, SOLUTION INTRAVENOUS CONTINUOUS
Status: DISPENSED | OUTPATIENT
Start: 2020-09-24 | End: 2020-09-25

## 2020-09-24 RX ORDER — SODIUM CHLORIDE, SODIUM LACTATE, POTASSIUM CHLORIDE, AND CALCIUM CHLORIDE .6; .31; .03; .02 G/100ML; G/100ML; G/100ML; G/100ML
250 INJECTION, SOLUTION INTRAVENOUS PRN
Status: DISCONTINUED | OUTPATIENT
Start: 2020-09-24 | End: 2020-09-25 | Stop reason: HOSPADM

## 2020-09-24 RX ORDER — BUPIVACAINE HYDROCHLORIDE 2.5 MG/ML
INJECTION, SOLUTION EPIDURAL; INFILTRATION; INTRACAUDAL PRN
Status: DISCONTINUED | OUTPATIENT
Start: 2020-09-24 | End: 2020-09-25 | Stop reason: SURG

## 2020-09-24 RX ADMIN — SODIUM CHLORIDE, POTASSIUM CHLORIDE, SODIUM LACTATE AND CALCIUM CHLORIDE: 600; 310; 30; 20 INJECTION, SOLUTION INTRAVENOUS at 21:11

## 2020-09-24 RX ADMIN — SODIUM CHLORIDE 2.5 MILLION UNITS: 9 INJECTION, SOLUTION INTRAVENOUS at 22:41

## 2020-09-24 RX ADMIN — ROPIVACAINE HYDROCHLORIDE 200 MG: 2 INJECTION, SOLUTION EPIDURAL; INFILTRATION at 21:07

## 2020-09-24 RX ADMIN — BUPIVACAINE HYDROCHLORIDE 5 ML: 2.5 INJECTION, SOLUTION EPIDURAL; INFILTRATION; INTRACAUDAL; PERINEURAL at 21:01

## 2020-09-24 RX ADMIN — FENTANYL CITRATE 100 MCG: 50 INJECTION, SOLUTION INTRAMUSCULAR; INTRAVENOUS at 21:01

## 2020-09-24 RX ADMIN — SODIUM CHLORIDE, POTASSIUM CHLORIDE, SODIUM LACTATE AND CALCIUM CHLORIDE: 600; 310; 30; 20 INJECTION, SOLUTION INTRAVENOUS at 18:48

## 2020-09-24 RX ADMIN — OXYTOCIN 2 MILLI-UNITS/MIN: 10 INJECTION, SOLUTION INTRAMUSCULAR; INTRAVENOUS at 19:29

## 2020-09-24 RX ADMIN — PENICILLIN G POTASSIUM 5 MILLION UNITS: 5000000 POWDER, FOR SOLUTION INTRAMUSCULAR; INTRAPLEURAL; INTRATHECAL; INTRAVENOUS at 18:49

## 2020-09-24 RX ADMIN — ROPIVACAINE HYDROCHLORIDE 200 MG: 2 INJECTION, SOLUTION EPIDURAL; INFILTRATION; PERINEURAL at 21:07

## 2020-09-24 ASSESSMENT — LIFESTYLE VARIABLES: EVER_SMOKED: NEVER

## 2020-09-24 ASSESSMENT — PAIN DESCRIPTION - PAIN TYPE
TYPE: ACUTE PAIN

## 2020-09-25 LAB
ERYTHROCYTE [DISTWIDTH] IN BLOOD BY AUTOMATED COUNT: 39.9 FL (ref 35.9–50)
HCT VFR BLD AUTO: 30.9 % (ref 37–47)
HGB BLD-MCNC: 9.9 G/DL (ref 12–16)
MCH RBC QN AUTO: 25.6 PG (ref 27–33)
MCHC RBC AUTO-ENTMCNC: 32 G/DL (ref 33.6–35)
MCV RBC AUTO: 79.8 FL (ref 81.4–97.8)
PLATELET # BLD AUTO: 210 K/UL (ref 164–446)
PMV BLD AUTO: 12.7 FL (ref 9–12.9)
RBC # BLD AUTO: 3.87 M/UL (ref 4.2–5.4)
WBC # BLD AUTO: 12.5 K/UL (ref 4.8–10.8)

## 2020-09-25 PROCEDURE — 770002 HCHG ROOM/CARE - OB PRIVATE (112)

## 2020-09-25 PROCEDURE — 0UBGXZZ EXCISION OF VAGINA, EXTERNAL APPROACH: ICD-10-PCS | Performed by: OBSTETRICS & GYNECOLOGY

## 2020-09-25 PROCEDURE — A9270 NON-COVERED ITEM OR SERVICE: HCPCS | Performed by: OBSTETRICS & GYNECOLOGY

## 2020-09-25 PROCEDURE — 700102 HCHG RX REV CODE 250 W/ 637 OVERRIDE(OP): Performed by: OBSTETRICS & GYNECOLOGY

## 2020-09-25 PROCEDURE — 700111 HCHG RX REV CODE 636 W/ 250 OVERRIDE (IP): Performed by: OBSTETRICS & GYNECOLOGY

## 2020-09-25 PROCEDURE — 304965 HCHG RECOVERY SERVICES

## 2020-09-25 PROCEDURE — 36415 COLL VENOUS BLD VENIPUNCTURE: CPT

## 2020-09-25 PROCEDURE — 303615 HCHG EPIDURAL/SPINAL ANESTHESIA FOR LABOR

## 2020-09-25 PROCEDURE — 85027 COMPLETE CBC AUTOMATED: CPT

## 2020-09-25 PROCEDURE — 59409 OBSTETRICAL CARE: CPT

## 2020-09-25 RX ORDER — ONDANSETRON 2 MG/ML
4 INJECTION INTRAMUSCULAR; INTRAVENOUS EVERY 6 HOURS PRN
Status: DISCONTINUED | OUTPATIENT
Start: 2020-09-25 | End: 2020-09-27 | Stop reason: HOSPADM

## 2020-09-25 RX ORDER — SODIUM CHLORIDE, SODIUM LACTATE, POTASSIUM CHLORIDE, CALCIUM CHLORIDE 600; 310; 30; 20 MG/100ML; MG/100ML; MG/100ML; MG/100ML
INJECTION, SOLUTION INTRAVENOUS PRN
Status: DISCONTINUED | OUTPATIENT
Start: 2020-09-25 | End: 2020-09-27 | Stop reason: HOSPADM

## 2020-09-25 RX ORDER — ONDANSETRON 4 MG/1
4 TABLET, ORALLY DISINTEGRATING ORAL EVERY 6 HOURS PRN
Status: DISCONTINUED | OUTPATIENT
Start: 2020-09-25 | End: 2020-09-27 | Stop reason: HOSPADM

## 2020-09-25 RX ORDER — OXYCODONE HYDROCHLORIDE AND ACETAMINOPHEN 5; 325 MG/1; MG/1
2 TABLET ORAL EVERY 4 HOURS PRN
Status: DISCONTINUED | OUTPATIENT
Start: 2020-09-25 | End: 2020-09-27 | Stop reason: HOSPADM

## 2020-09-25 RX ORDER — OXYCODONE HYDROCHLORIDE AND ACETAMINOPHEN 5; 325 MG/1; MG/1
1 TABLET ORAL EVERY 4 HOURS PRN
Status: DISCONTINUED | OUTPATIENT
Start: 2020-09-25 | End: 2020-09-27 | Stop reason: HOSPADM

## 2020-09-25 RX ORDER — IBUPROFEN 600 MG/1
600 TABLET ORAL EVERY 6 HOURS PRN
Status: DISCONTINUED | OUTPATIENT
Start: 2020-09-25 | End: 2020-09-27 | Stop reason: HOSPADM

## 2020-09-25 RX ADMIN — IBUPROFEN 600 MG: 600 TABLET ORAL at 16:39

## 2020-09-25 RX ADMIN — IBUPROFEN 600 MG: 600 TABLET ORAL at 04:58

## 2020-09-25 RX ADMIN — OXYCODONE HYDROCHLORIDE AND ACETAMINOPHEN 1 TABLET: 5; 325 TABLET ORAL at 10:01

## 2020-09-25 RX ADMIN — OXYCODONE HYDROCHLORIDE AND ACETAMINOPHEN 2 TABLET: 5; 325 TABLET ORAL at 04:58

## 2020-09-25 RX ADMIN — OXYCODONE HYDROCHLORIDE AND ACETAMINOPHEN 1 TABLET: 5; 325 TABLET ORAL at 20:50

## 2020-09-25 RX ADMIN — OXYTOCIN 125 ML/HR: 10 INJECTION, SOLUTION INTRAMUSCULAR; INTRAVENOUS at 01:48

## 2020-09-25 RX ADMIN — OXYTOCIN 2000 ML/HR: 10 INJECTION, SOLUTION INTRAMUSCULAR; INTRAVENOUS at 01:08

## 2020-09-25 RX ADMIN — IBUPROFEN 600 MG: 600 TABLET ORAL at 22:50

## 2020-09-25 ASSESSMENT — EDINBURGH POSTNATAL DEPRESSION SCALE (EPDS)
THE THOUGHT OF HARMING MYSELF HAS OCCURRED TO ME: NEVER
I HAVE BEEN SO UNHAPPY THAT I HAVE HAD DIFFICULTY SLEEPING: NOT VERY OFTEN
I HAVE BEEN ANXIOUS OR WORRIED FOR NO GOOD REASON: YES, SOMETIMES
I HAVE BLAMED MYSELF UNNECESSARILY WHEN THINGS WENT WRONG: YES, SOME OF THE TIME
I HAVE BEEN ABLE TO LAUGH AND SEE THE FUNNY SIDE OF THINGS: AS MUCH AS I ALWAYS COULD
I HAVE FELT SAD OR MISERABLE: NOT VERY OFTEN
I HAVE LOOKED FORWARD WITH ENJOYMENT TO THINGS: AS MUCH AS I EVER DID
I HAVE BEEN SO UNHAPPY THAT I HAVE BEEN CRYING: ONLY OCCASIONALLY
THINGS HAVE BEEN GETTING ON TOP OF ME: NO, MOST OF THE TIME I HAVE COPED QUITE WELL
I HAVE FELT SCARED OR PANICKY FOR NO GOOD REASON: NO, NOT MUCH

## 2020-09-25 ASSESSMENT — PAIN DESCRIPTION - PAIN TYPE
TYPE: ACUTE PAIN

## 2020-09-25 ASSESSMENT — PAIN SCALES - GENERAL: PAIN_LEVEL: 0

## 2020-09-25 NOTE — LACTATION NOTE
This note was copied from a baby's chart.  Mother reports that she is breastfeeding her  without difficulty or discomfort. Encouraged her to reach out to us for any concerns or questions. She has breast fed her other 2 children for 4 months each.

## 2020-09-25 NOTE — L&D DELIVERY NOTE
DELIVERY NOTE: 2020    Primary OB: Fer Monroe MD     25 year old  38.6 weeks IOL, active labor. (+) leaking of fluid per vagina x 2 days. Cervix on admission was 3-4/60/-2  GBS positive. She received 2 doses of PCN IV prior to delivery  Pitocin augmentation.   She progressed uneventfully in labor and delivered via  at 0104 under epidural   to a LBM  in OA with BW pending  AS 8/9.  Baby was placed skin to skin good suctioning of the nose and mouth was done, cord clamped after 30 secs and cut by FOB    Light meconium stained AF.  Placenta was delivered spontaneously at 0108 complete and intact with 3 vessel cord.   Estimated Blood loss- 250 cc  Uterus noted to be firm and contracted immediately postpartum.  3 cm vaginal cyst at the posterior wall was excised sharply. Vaginal defect was repaired using Chromic 3-0 in the continuous interlocking fashion with excellent hemostasis.   No other cervical nor vaginal lacerations noted.  Patient tolerated the procedure well. No complications encountered.  Both patient and baby are in good condition.     FER MONROE MD

## 2020-09-25 NOTE — H&P
History and Physical      Gayle Pagan is a 25 y.o. female  at 38w6d who presents for LOF    Subjective:   positive  For CTXS.   positive Feels pain   positive for LOF  negative for vaginal bleeding.   positive for fetal movement    ROS: A comprehensive review of systems was negative.    History reviewed. No pertinent past medical history.  Past Surgical History:   Procedure Laterality Date   • MINDA BY LAPAROSCOPY  2013    Performed by Robin Dash M.D. at SURGERY SAME DAY [facility]     Family History   Problem Relation Age of Onset   • Diabetes Paternal Grandfather      OB History    Para Term  AB Living   3 2 1     2   SAB TAB Ectopic Molar Multiple Live Births           1 2      # Outcome Date GA Lbr Rito/2nd Weight Sex Delivery Anes PTL Lv   3 Current            2 Term 2019     Vag-Spont   NAZ   1A Term 07/12/15 39w6d  4.03 kg (8 lb 14.2 oz) M Vag-Spont EPI N NAZ   1B               Social History     Socioeconomic History   • Marital status: Single     Spouse name: Not on file   • Number of children: Not on file   • Years of education: Not on file   • Highest education level: Not on file   Occupational History   • Not on file   Social Needs   • Financial resource strain: Not on file   • Food insecurity     Worry: Not on file     Inability: Not on file   • Transportation needs     Medical: Not on file     Non-medical: Not on file   Tobacco Use   • Smoking status: Never Smoker   • Smokeless tobacco: Never Used   Substance and Sexual Activity   • Alcohol use: No   • Drug use: No   • Sexual activity: Yes     Partners: Male     Comment: Unplanned pregnancy   Lifestyle   • Physical activity     Days per week: Not on file     Minutes per session: Not on file   • Stress: Not on file   Relationships   • Social connections     Talks on phone: Not on file     Gets together: Not on file     Attends [employer] service: Not on file     Active member of club or organization: Not on  file     Attends meetings of clubs or organizations: Not on file     Relationship status: Not on file   • Intimate partner violence     Fear of current or ex partner: Not on file     Emotionally abused: Not on file     Physically abused: Not on file     Forced sexual activity: Not on file   Other Topics Concern   • Not on file   Social History Narrative   • Not on file     Allergies: Patient has no known allergies.    Current Facility-Administered Medications:   •  ondansetron (ZOFRAN ODT) dispertab 4 mg, 4 mg, Oral, Q6HRS PRN **OR** ondansetron (ZOFRAN) syringe/vial injection 4 mg, 4 mg, Intravenous, Q6HRS PRN, Joann Albarran M.D.  •  oxytocin (PITOCIN) infusion (for postpartum), 2,000 mL/hr, Intravenous, Once **FOLLOWED BY** oxytocin (PITOCIN) infusion (for postpartum),  mL/hr, Intravenous, Continuous, Joann Albarran M.D.  •  ibuprofen (MOTRIN) tablet 600 mg, 600 mg, Oral, Q6HRS PRN, Joann Albarran M.D.  •  oxyCODONE-acetaminophen (PERCOCET) 5-325 MG per tablet 1 Tab, 1 Tab, Oral, Q4HRS PRN, Joann Albarran M.D.  •  oxyCODONE-acetaminophen (PERCOCET) 5-325 MG per tablet 2 Tab, 2 Tab, Oral, Q4HRS PRN, Joann Albarran M.D.  •  penicillin G potassium 2.5 Million Units in  mL IVPB, 2.5 Million Units, Intravenous, Q4HRS, Joann Albarran M.D., Last Rate: 200 mL/hr at 09/24/20 2241, 2.5 Million Units at 09/24/20 2241  •  oxytocin (PITOCIN) 20 UNITS/1000ML LR (induction of labor), 0.5-20 kurt-units/min, Intravenous, Continuous, Joann Albarran M.D., Stopped at 09/25/20 0108  •  LR infusion, , Intravenous, Continuous, Joann Albarran M.D., Stopped at 09/25/20 0108  •  fentaNYL (SUBLIMAZE) injection 50 mcg, 50 mcg, Intravenous, Q HOUR PRN, Joann Albarran M.D.  •  fentaNYL (SUBLIMAZE) injection 100 mcg, 100 mcg, Intravenous, Q HOUR PRN, Joann Albarran M.D.  •   "hydrOXYzine HCl (ATARAX) tablet 50 mg, 50 mg, Oral, Q6HRS PRN, Joann Albarran M.D.  •  miSOPROStol (CYTOTEC) tablet 800 mcg, 800 mcg, Rectal, Once PRN, Joann Albarran M.D.  •  methylergonovine (METHERGINE) injection 0.2 mg, 0.2 mg, Intramuscular, Once PRN, Joann Albarran M.D.  •  lactated ringers (BOLUS) infusion, 1,000 mL, Intravenous, Once PRN, Adán Brice M.D.  •  ropivacaine 0.2 % (NAROPIN) injection, , Epidural, Continuous, Adán Brice M.D., Stopped at 09/25/20 0126  •  ePHEDrine injection 5 mg, 5 mg, Intravenous, Q5 MIN PRN **AND** Notify Anesthesiologist if ephedrine given and for sustained hypotension (more than 2 minutes), , , Once **AND** For Hypotension: Place patient in left lateral tilt to achieve uterine displacement and elevate legs., , , PRN **AND** Hypotension: Oxygen Continuous, , , CONTINUOUS **AND** lactated ringers infusion (BOLUS), 250 mL, Intravenous, PRN, Adán Brice M.D.    Prenatal care with Arnel David   There are no active problems to display for this patient.    Objective:      /77   Pulse 60   Temp 36.4 °C (97.6 °F) (Temporal)   Resp 16   Ht 1.651 m (5' 5\")   Wt 90.7 kg (200 lb)   SpO2 98%     General:   no acute distress, alert, cooperative   Skin:   normal   HEENT:  Sclera clear, anicteric   Lungs:   CTA bilateral   Heart:   S1, S2 normal, no murmur, click, rub or gallop, regular rate and rhythm   Abdomen:   gravid, NT   EFW:  3400   Pelvis:  adequate with gynecoid pelvis   FHT:  150 BPM   Uterine Size: S=D   Presentations: Cephalic   Cervix:     Dilation: complete    Effacement:     Station:  +1    Consistency:     Position:      Lab Review  Lab:   Blood type: O     Recent Results (from the past 5880 hour(s))   RUBELLA    Collection Time: 04/09/20 12:00 AM   Result Value Ref Range    Rubella IgG Antibody Immune    HEPATITIS B SURFACE ANTIGEN    Collection Time: 04/09/20 12:00 AM   Result Value Ref Range    " Hepatitis B Surface Antigen Negative    OP Prenatal Panel-Blood Bank    Collection Time: 04/09/20 12:00 AM   Result Value Ref Range    Rh Grouping Only Positive     ABO Grouping Only O     Antibody Screen Scrn Negative    HIV AG/AB COMBO ASSAY SCREENING    Collection Time: 04/09/20 12:00 AM   Result Value Ref Range    HIV Ag/Ab Combo Assay Non Reactive    T.PALLIDUM AB EIA    Collection Time: 04/09/20 12:00 AM   Result Value Ref Range    Syphilis, Treponemal Qual Non Reactive    GRP B STREP, BY PCR (FRAGA BROTH)    Collection Time: 09/01/20 12:00 AM    Specimen: Genital   Result Value Ref Range    Strep Gp B DNA PCR Positive    COVID/SARS CoV-2 PCR    Collection Time: 09/23/20  9:32 AM    Specimen: Nasopharyngeal; Respirate   Result Value Ref Range    COVID Order Status Received    SARS-CoV-2, PCR (In-House)    Collection Time: 09/23/20  9:32 AM   Result Value Ref Range    SARS-CoV-2 Source NP Swab     SARS-CoV-2 by PCR NotDetected    AMNISURE ROM ASSAY    Collection Time: 09/24/20  5:05 PM   Result Value Ref Range    AmniSure ROM Positive (AA) Negative   Hold Blood Bank Specimen (Not Tested)    Collection Time: 09/24/20  6:45 PM   Result Value Ref Range    Holding Tube - Bb DONE    CBC WITH DIFFERENTIAL    Collection Time: 09/24/20  6:45 PM   Result Value Ref Range    WBC 11.3 (H) 4.8 - 10.8 K/uL    RBC 4.29 4.20 - 5.40 M/uL    Hemoglobin 11.1 (L) 12.0 - 16.0 g/dL    Hematocrit 34.8 (L) 37.0 - 47.0 %    MCV 81.1 (L) 81.4 - 97.8 fL    MCH 25.9 (L) 27.0 - 33.0 pg    MCHC 31.9 (L) 33.6 - 35.0 g/dL    RDW 41.0 35.9 - 50.0 fL    Platelet Count 231 164 - 446 K/uL    MPV 12.6 9.0 - 12.9 fL    Neutrophils-Polys 78.50 (H) 44.00 - 72.00 %    Lymphocytes 15.20 (L) 22.00 - 41.00 %    Monocytes 5.10 0.00 - 13.40 %    Eosinophils 0.40 0.00 - 6.90 %    Basophils 0.40 0.00 - 1.80 %    Immature Granulocytes 0.40 0.00 - 0.90 %    Nucleated RBC 0.00 /100 WBC    Neutrophils (Absolute) 8.87 (H) 2.00 - 7.15 K/uL    Lymphs (Absolute) 1.72  1.00 - 4.80 K/uL    Monos (Absolute) 0.58 0.00 - 0.85 K/uL    Eos (Absolute) 0.04 0.00 - 0.51 K/uL    Baso (Absolute) 0.04 0.00 - 0.12 K/uL    Immature Granulocytes (abs) 0.04 0.00 - 0.11 K/uL    NRBC (Absolute) 0.00 K/uL        Assessment:   Gayle Pagan at 38w6d  Labor status: Active phase labor.  Vaginal cyst  Vulvar varicosities  Obstetrical history significant for There are no active problems to display for this patient.  .      Plan:     Admit to L&D  GBS Negative   PCN for GBS  Epidural for pain mgt  Anticipate     Joann David MD

## 2020-09-25 NOTE — ANESTHESIA PROCEDURE NOTES
Epidural Block    Date/Time: 9/24/2020 8:51 PM  Performed by: Adán Brice M.D.  Authorized by: Adán Brice M.D.     Patient Location:  OB  Start Time:  9/24/2020 8:51 PM  End Time:  9/24/2020 9:01 PM  Reason for Block: labor analgesia    patient identified, IV checked, site marked, risks and benefits discussed, surgical consent, monitors and equipment checked, pre-op evaluation and timeout performed    Patient Position:  Sitting  Prep: ChloraPrep, patient draped and sterile technique    Monitoring:  Blood pressure, continuous pulse oximetry and heart rate  Approach:  Midline  Location:  L4-L5  Injection Technique:  LIZETH saline  Skin infiltration:  Lidocaine  Strength:  1%  Dose:  3ml  Needle Type:  Tuohy  Needle Gauge:  17 G  Needle Length:  3.5 in  Loss of resistance::  6  Catheter Size:  19 G  Catheter at Skin Depth:  11  Test Dose Result:  Negative   1 level attempt, narrow target with os at multiple trajectories.  Good LIZETH

## 2020-09-25 NOTE — ANESTHESIA QCDR
2019 [facility] Clinical Data Registry (for Quality Improvement)     Postoperative nausea/vomiting risk protocol (Adult = 18 yrs and Pediatric 3-17 yrs)- (430 and 463)  General inhalation anesthetic (NOT TIVA) with PONV risk factors: No  Provision of anti-emetic therapy with at least 2 different classes of agents: N/A  Patient DID NOT receive anti-emetic therapy and reason is documented in Medical Record: N/A    Multimodal Pain Management- (477)  Non-emergent surgery AND patient age >= 18: No  Use of Multimodal Pain Management, two or more drugs and/or interventions, NOT including systemic opioids:   Exception: Documented allergy to multiple classes of analgesics:     Smoking Abstinence (404)  Patient is current smoker (cigarette, pipe, e-cig, marijuanna): No  Elective Surgery:   Abstinence instructions provided prior to day of surgery:   Patient abstained from smoking on day of surgery:     Pre-Op Beta-Blocker in Isolated CABG (44)  Isolated CABG AND patient age >= 18: No  Beta-blocker admin within 24 hours of surgical incision:   Exception:of medical reason(s) for not administering beta blocker within 24 hours prior to surgical incision (e.g., not  indicated,other medical reason):     PACU assessment of acute postoperative pain prior to Anesthesia Care End- Applies to Patients Age = 18- (ABG7)  Initial PACU pain score is which of the following: < 7/10  Patient unable to report pain score: N/A    Post-anesthetic transfer of care checklist/protocol to PACU/ICU- (426 and 427)  Upon conclusion of case, patient transferred to which of the following locations: PACU/Non-ICU  Use of transfer checklist/protocol: Yes  Exclusion: Service Performed in Patient Hospital Room (and thus did not require transfer): N/A  Unplanned admission to ICU related to anesthesia service up through end of PACU care- (MD51)  Unplanned admission to ICU (not initially anticipated at anesthesia start time): No

## 2020-09-25 NOTE — CARE PLAN
Patient has scant to light lochia with a firm palpable uterus.  Vital signs are within defined limits.  Assessment will continue.    Patient will ask for pain medication when needed.  Pain assessment will continue.

## 2020-09-25 NOTE — PROGRESS NOTES
1900- Report received at bedside from Soraya BRYANT. Care assumed at this time.    1929- Pitocin started, see mar.    2049- Dr. Brice at bedside for epidural placement. Time out called, see flowsheets. Test dose given without complications.    2135- SVE, see flowsheets.     2147- Dr. Arnel David updated on pt status and cervical exam.     0057- SVE see flowsheets. BBOW with exam that ruptured, meconium stained fluid. Moderate amount of fluid, no odor noted.    0008- Dr. Arnel David updated on SVE and meconium. Physician on her way to the hospital.    0012- RT updated on meconium and will be needed for delivery.    0044- IVF bolus initiated for repetitive variable decelerations, see mar.    0052- Dr. Arnel David updated on repetitive variables but moderate variability. IVF opened. Physician is at the hospital, and would like RN to recheck the pt's cervix.    0055- SVE, pt is complete, see flowsheets. Dr. Arnel David called to bedside. RT and transition called to bedside for delivery.    0104- Viable male infant, 8/9 apgars.  Midline vaginal cyst excision performed by Dr. Arnel David with 3-0 chromic CT-1. Small midline hematoma present, evacuated by provider.    0330- Report called to STACI Green RN. Pt up to  with assistance of Chey BRYANT. Joan care and new gown provided. Pt transferred with infant in arms via wheelchair by Chey BRYANT. All belongings gathered and transferred with patient from [place] [id number] to [place] [id number]. Bands verified x2.

## 2020-09-25 NOTE — ANESTHESIA TIME REPORT
Anesthesia Start and Stop Event Times     Date Time Event    9/24/2020 2048 Ready for Procedure     2048 Anesthesia Start    9/25/2020 0104 Anesthesia Stop        Responsible Staff  09/24/20 to 09/25/20    Name Role Begin End    Adán Brice M.D. Anesth 2048 0104        Preop Diagnosis (Free Text):  Pre-op Diagnosis             Preop Diagnosis (Codes):    Post op Diagnosis  Pregnancy      Premium Reason  A. 3PM - 7AM    Comments:

## 2020-09-25 NOTE — CARE PLAN
Problem: Infection  Goal: Will remain free from infection  Outcome: PROGRESSING AS EXPECTED    No s/s of infection. VSS. Afebrile.     Problem: Pain  Goal: Alleviation of Pain or a reduction in pain to the patient's comfort goal  Outcome: PROGRESSING AS EXPECTED    Pt provided education on pain management options. Pt verbalized understanding and desires to have an epidural. Pt will let RN know when intervention is desired.

## 2020-09-25 NOTE — PROGRESS NOTES
Patient comes in with complaints of leaking fluid xs2 today, clear.  Sterile speculum attempted, a cyst was noted at the entrance of the vagina that is painful on attempt to place speculum.  Dr Arnel David called.  Amnisure ordered. SVE is 3-4/60/-2.  Dr Arnel David notified, patient to be admitted.  Report given to Soraya BRYANT.

## 2020-09-26 PROCEDURE — 700102 HCHG RX REV CODE 250 W/ 637 OVERRIDE(OP): Performed by: OBSTETRICS & GYNECOLOGY

## 2020-09-26 PROCEDURE — 770002 HCHG ROOM/CARE - OB PRIVATE (112)

## 2020-09-26 PROCEDURE — A9270 NON-COVERED ITEM OR SERVICE: HCPCS | Performed by: OBSTETRICS & GYNECOLOGY

## 2020-09-26 RX ORDER — FERROUS SULFATE 325(65) MG
325 TABLET ORAL 2 TIMES DAILY WITH MEALS
Status: DISCONTINUED | OUTPATIENT
Start: 2020-09-26 | End: 2020-09-27 | Stop reason: HOSPADM

## 2020-09-26 RX ADMIN — FERROUS SULFATE TAB 325 MG (65 MG ELEMENTAL FE) 325 MG: 325 (65 FE) TAB at 18:32

## 2020-09-26 RX ADMIN — IBUPROFEN 600 MG: 600 TABLET ORAL at 11:25

## 2020-09-26 RX ADMIN — FERROUS SULFATE TAB 325 MG (65 MG ELEMENTAL FE) 325 MG: 325 (65 FE) TAB at 08:56

## 2020-09-26 RX ADMIN — OXYCODONE HYDROCHLORIDE AND ACETAMINOPHEN 2 TABLET: 5; 325 TABLET ORAL at 18:34

## 2020-09-26 RX ADMIN — IBUPROFEN 600 MG: 600 TABLET ORAL at 04:44

## 2020-09-26 RX ADMIN — OXYCODONE HYDROCHLORIDE AND ACETAMINOPHEN 1 TABLET: 5; 325 TABLET ORAL at 08:59

## 2020-09-26 RX ADMIN — IBUPROFEN 600 MG: 600 TABLET ORAL at 18:32

## 2020-09-26 ASSESSMENT — PAIN DESCRIPTION - PAIN TYPE: TYPE: ACUTE PAIN

## 2020-09-26 NOTE — CARE PLAN
Problem: Communication  Goal: The ability to communicate needs accurately and effectively will improve  Note: Plan of care discussed verbalized understanding     Problem: Safety  Goal: Will remain free from injury  Note: Bed locked   Will continue to assess

## 2020-09-26 NOTE — PROGRESS NOTES
Post Partum Progress Note    Name:   Gayle Pagan   Date/Time:  9/26/2020 - 8:11 AM  Chief Admitting Dx:  Pregnancy  Labor and delivery, indication for care  Delivery Type:  vaginal, spontaneous; vaginal cyst excision   Post-Op/Post Partum Days #:  1    Subjective:  Abdominal pain: no  Ambulating:   yes  Tolerating liquids:  yes  Tolerating food:  yes common adult  Flatus:   yes  BM:    no  Bleeding:   without any bleeding  Voiding:   yes  Dizziness:   no  Feeding:   breast    Vitals:    09/25/20 0400 09/25/20 1000 09/25/20 1800 09/26/20 0600   BP: 127/77 105/61 114/73 125/81   Pulse: 64 64 72 64   Resp: 18 15 17 16   Temp: 37.1 °C (98.8 °F) 36.2 °C (97.2 °F) 36.7 °C (98 °F) 36.7 °C (98 °F)   TempSrc: Temporal Temporal Temporal Temporal   SpO2: 96% 91% 94% 92%   Weight:       Height:           Exam:  Breast: Tenderness no  Abdomen: Abdomen soft, non-tender. BS normal. No masses,  No organomegaly  Fundal Tenderness:  no  Fundus Firm: yes  Below umbilicus: yes  Perineum: perineum intact  Lochia: mild  Extremities: Normal extremities, peripheral pulses and reflexes normal    Meds:  Current Facility-Administered Medications   Medication Dose   • ondansetron (ZOFRAN ODT) dispertab 4 mg  4 mg    Or   • ondansetron (ZOFRAN) syringe/vial injection 4 mg  4 mg   • oxytocin (PITOCIN) infusion (for postpartum)   mL/hr   • ibuprofen (MOTRIN) tablet 600 mg  600 mg   • oxyCODONE-acetaminophen (PERCOCET) 5-325 MG per tablet 1 Tab  1 Tab   • oxyCODONE-acetaminophen (PERCOCET) 5-325 MG per tablet 2 Tab  2 Tab   • LR infusion     • tetanus-dipth-acell pertussis (Tdap) inj 0.5 mL  0.5 mL   • measles, mumps and rubella vaccine (MMR) injection 0.5 mL  0.5 mL       Labs:   Recent Labs     09/24/20  1845 09/25/20  1638   WBC 11.3* 12.5*   RBC 4.29 3.87*   HEMOGLOBIN 11.1* 9.9*   HEMATOCRIT 34.8* 30.9*   MCV 81.1* 79.8*   MCH 25.9* 25.6*   MCHC 31.9* 32.0*   RDW 41.0 39.9   PLATELETCT 231 210   MPV 12.6 12.7        Assessment:  Chief Admitting Dx:  Pregnancy  Labor and delivery, indication for care  Delivery Type:  vaginal, spontaneous; vaginal cyst excision   Tubal Ligation:  no    Plan:  Continue routine post partum care.  Anticipate d/c home tomorrow with baby  GBS positive     Joann Albarran M.D.

## 2020-09-26 NOTE — LACTATION NOTE
This note was copied from a baby's chart.  MOB is a 26 y/o  who delivered a 38 6/7 gestation infant. She  her other two for r months until she returned to work. Minimal assist with latch and positioning with a deep latch achieved rapidly; after a period of time, infant released and MOB demoed latch technique with results of a deep latch MOB has WIC, therefore, encouraged MOB to contact them for a pump as she is getting closer to returning to work.Lactation education as in assessment. Family voices understanding.

## 2020-09-26 NOTE — CARE PLAN
Problem: Safety  Goal: Will remain free from falls  Outcome: PROGRESSING AS EXPECTED  Note: Patient ambulating without additional assistance. Patient wearing non-slip socks and room is cluttered free. Call light and personal items all within reach. Patient reminded on call light if assistance is needed. Will continue to monitor for safety.       Problem: Alteration in comfort related to episiotomy, vaginal repair and/or after birth pains  Goal: Patient is able to ambulate, care for self and infant  Outcome: PROGRESSING AS EXPECTED  Note: Patient is ambulating without assistance. Patient is feeding, diapering, and burping infant as needed. Patient demonstrates ability to care for herself and infant. Patient is progressing as expected.

## 2020-09-26 NOTE — PROGRESS NOTES
Received report from Jane BRYANT. Assumed patient care. Patient assessed and VSS. Patient rated pain 6. Breasts soft, fundus firm, lochia light. Legs show no sign of swelling, heat, or redness. Patient is ambulating without assistance. All questions and concerns answered at this time. Will continue to monitor. Bed rails up x 2, call light within reach.

## 2020-09-27 VITALS
HEART RATE: 56 BPM | SYSTOLIC BLOOD PRESSURE: 101 MMHG | RESPIRATION RATE: 17 BRPM | HEIGHT: 65 IN | TEMPERATURE: 98.2 F | OXYGEN SATURATION: 98 % | WEIGHT: 200 LBS | BODY MASS INDEX: 33.32 KG/M2 | DIASTOLIC BLOOD PRESSURE: 64 MMHG

## 2020-09-27 PROCEDURE — A9270 NON-COVERED ITEM OR SERVICE: HCPCS | Performed by: OBSTETRICS & GYNECOLOGY

## 2020-09-27 PROCEDURE — 700111 HCHG RX REV CODE 636 W/ 250 OVERRIDE (IP): Performed by: OBSTETRICS & GYNECOLOGY

## 2020-09-27 PROCEDURE — 90686 IIV4 VACC NO PRSV 0.5 ML IM: CPT | Performed by: OBSTETRICS & GYNECOLOGY

## 2020-09-27 PROCEDURE — 90471 IMMUNIZATION ADMIN: CPT

## 2020-09-27 PROCEDURE — 700102 HCHG RX REV CODE 250 W/ 637 OVERRIDE(OP): Performed by: OBSTETRICS & GYNECOLOGY

## 2020-09-27 RX ADMIN — IBUPROFEN 600 MG: 600 TABLET ORAL at 08:29

## 2020-09-27 RX ADMIN — FERROUS SULFATE TAB 325 MG (65 MG ELEMENTAL FE) 325 MG: 325 (65 FE) TAB at 08:28

## 2020-09-27 RX ADMIN — INFLUENZA A VIRUS A/GUANGDONG-MAONAN/SWL1536/2019 CNIC-1909 (H1N1) ANTIGEN (FORMALDEHYDE INACTIVATED), INFLUENZA A VIRUS A/HONG KONG/2671/2019 (H3N2) ANTIGEN (FORMALDEHYDE INACTIVATED), INFLUENZA B VIRUS B/PHUKET/3073/2013 ANTIGEN (FORMALDEHYDE INACTIVATED), AND INFLUENZA B VIRUS B/WASHINGTON/02/2019 ANTIGEN (FORMALDEHYDE INACTIVATED) 0.5 ML: 15; 15; 15; 15 INJECTION, SUSPENSION INTRAMUSCULAR at 12:13

## 2020-09-27 RX ADMIN — IBUPROFEN 600 MG: 600 TABLET ORAL at 00:48

## 2020-09-27 ASSESSMENT — PAIN DESCRIPTION - PAIN TYPE
TYPE: ACUTE PAIN
TYPE: ACUTE PAIN

## 2020-09-27 NOTE — DISCHARGE INSTRUCTIONS
POSTPARTUM DISCHARGE INSTRUCTIONS FOR MOM    YOB: 1995   Age: 25 y.o.               Admit Date: 2020     Discharge Date: 2020  Attending Doctor:  Joann Christian*                  Allergies:  Patient has no known allergies.    Discharged to home by car. Discharged via wheelchair, hospital escort: Yes.  Special equipment needed: Not Applicable  Belongings with: Personal  Be sure to schedule a follow-up appointment with your primary care doctor or any specialists as instructed.     Discharge Plan:   Diet Plan: Discussed  Activity Level: Discussed  Confirmed Follow up Appointment: Patient to Call and Schedule Appointment  Confirmed Symptoms Management: Discussed(postpartum and infant care)    REASONS TO CALL YOUR OBSTETRICIAN:  1.   Persistent fever or shaking chills (Temperature higher than 100.4)  2.   Heavy bleeding (soaking more than 1 pad per hour); Passing clots  3.   Foul odor from vagina  4.   Mastitis (Breast infection; breast pain, chills, fever, redness)  5.   Urinary pain, burning or frequency  6.   Episiotomy infection  7.   Abdominal incision infection  8.   Severe depression longer than 24 hours    HAND WASHING  · Prior to handling the baby.  · Before breastfeeding or bottle feeding baby.  · After using the bathroom or changing the baby's diaper.    WOUND CARE  Ask your physician for additional care instructions.  In general:    ·  Incision:      · Keep clean and dry.    · Do NOT lift anything heavier than your baby for up to 6 weeks.    · There should not be any opening or pus.      VAGINAL CARE  · Nothing inside vagina for 6 weeks: no sexual intercourse, tampons or douching.  · Bleeding may continue for 2-4 weeks.  Amount may vary.    · Call your physician for heavy bleeding which means soaking more than 1 pad per hour    BIRTH CONTROL  · It is possible to become pregnant at any time after delivery and while breastfeeding.  · Plan to discuss a method of birth  "control with your physician at your follow up visit. visit.    DIET AND ELIMINATION  · Eating more fiber (bran cereal, fruits, and vegetables) and drinking plenty of fluids will help to avoid constipation.  · Urinary frequency after childbirth is normal.    POSTPARTUM BLUES  During the first few days after birth, you may experience a sense of the \"blues\" which may include impatience, irritability or even crying.  These feeling come and go quickly.  However, as many as 1 in 10 women experience emotional symptoms known as postpartum depression.    Postpartum depression:  May start as early as the second or third day after delivery or take several weeks or months to develop.  Symptoms of \"blues\" are present, but are more intense:  Crying spells; loss of appetite; feelings of hopelessness or loss of control; fear of touching the baby; over concern or no concern at all about the baby; little or no concern about your own appearance/caring for yourself; and/or inability to sleep or excessive sleeping.  Contact your physician if you are experiencing any of these symptoms.    Crisis Hotline:  · [place] Crisis Hotline:  5-326-SJVDWJZ  Or 1-410.936.3232  · [place] Crisis Hotline:  1-807.827.5652  Or 956-275-3947    PREVENTING SHAKEN BABY:  If you are angry or stressed, PUT THE BABY IN THE CRIB, step away, take some deep breaths, and wait until you are calm to care for the baby.  DO NOT SHAKE THE BABY.  You are not alone, call a supporter for help.    · Crisis Call Center 24/7 crisis line 878-888-8845 or 1-295.764.8355  · You can also text them, text \"ANSWER\" to 382513    QUIT SMOKING/TOBACCO USE:  I understand the use of any tobacco products increases my chance of suffering from future heart disease and could cause other illnesses which may shorten my life. Quitting the use of tobacco products is the single most important thing I can do to improve my health. For further information on smoking / tobacco cessation call a Toll " Free Quit Line at 1-830.173.2640 (*[employer] [place]) or 1-867.565.9204 (American Lung Association) or you can access the web based program at www.lungusa.org.    · [place] Tobacco Users Help Line:  (719) 360-1779       Toll Free: 1-640.514.9298  · Quit Tobacco Program [facility] (123)539-4915    DEPRESSION / SUICIDE RISK:  As you are discharged from this [facility], it is important to learn how to keep safe from harming yourself.    Recognize the warning signs:  · Abrupt changes in personality, positive or negative- including increase in energy   · Giving away possessions  · Change in eating patterns- significant weight changes-  positive or negative  · Change in sleeping patterns- unable to sleep or sleeping all the time   · Unwillingness or inability to communicate  · Depression  · Unusual sadness, discouragement and loneliness  · Talk of wanting to die  · Neglect of personal appearance   · Rebelliousness- reckless behavior  · Withdrawal from people/activities they love  · Confusion- inability to concentrate     If you or a loved one observes any of these behaviors or has concerns about self-harm, here's what you can do:  · Talk about it- your feelings and reasons for harming yourself  · Remove any means that you might use to hurt yourself (examples: pills, rope, extension cords, firearm)  · Get professional help from the community (Mental Health, Substance Abuse, psychological counseling)  · Do not be alone:Call your Safe Contact- someone whom you trust who will be there for you.  · Call your local CRISIS HOTLINE 565-2385 or 048-072-1762  · Call your local Children's Mobile Crisis Response Team [place] (706) 154-9147 or www.[employer]  · Call the toll free National Suicide Prevention Hotlines   · National Suicide Prevention Lifeline 870-811-MOKI (3167)  · National Hope Line Network 800-SUICIDE (336-0835)    DISCHARGE SURVEY:  Thank you for choosing [facility].  We hope we provided you with very good care.  You may be receiving a survey in the mail.  Please fill it out.  Your opinion is valuable to us.    ADDITIONAL EDUCATIONAL MATERIALS GIVEN TO PATIENT:        My signature on this form indicates that:  1.  I have reviewed and understand the above information  2.  My questions regarding this information have been answered to my satisfaction.  3.  I have formulated a plan with my discharge nurse to obtain my prescribed medication for home.

## 2020-09-27 NOTE — PROGRESS NOTES
Discharge instructions reviewed with patient by discharge RN. [place] screen, mom and baby f/u appts also reviewed with mom by discharge nurse. Car seat present. Birth certificate done. Prescriptions given.Patient informed of discharge survey phone call.Bands matched and security tag removed.Mom and baby d/c'd to home.

## 2020-09-27 NOTE — PROGRESS NOTES
Received report from Katty BRYANT. Assumed patient care. POC reviewed with patient. All questions and concerns answered at this time. Will continue to monitor. Bed rails up x 2, call light within reach.

## 2020-09-27 NOTE — CARE PLAN
Problem: Potential knowledge deficit related to lack of understanding of self and  care  Goal: Patient will demonstrate ability to care for self and infant  Outcome: PROGRESSING AS EXPECTED  Note: Patient is able to get up and move around. Patient demonstrates ability to provide care for herself and for infant. Patient feeding at scheduled times and assisting with infant care. Patient is progressing as expected.       Problem: Potential for postpartum infection related to presence of episiotomy/vaginal tear and/or uterine contamination  Goal: Patient will be absent from signs and symptoms of infection  Outcome: PROGRESSING AS EXPECTED

## 2020-09-27 NOTE — LACTATION NOTE
Mom states she has no questions or concerns with breastfeeding. Denies discomfort during feedings. Mom states it is her choice to breast and formula feed.    Assistance offered prn

## 2020-09-27 NOTE — DISCHARGE SUMMARY
Discharge Summary:      Gayle Pagan    Admit Date:   2020  Discharge Date:  2020     Admitting diagnosis:  Pregnancy  Labor and delivery, indication for care  Discharge Diagnosis: Status post vaginal, spontaneous; vaginal cyst excision   Pregnancy Complications: group B strep (treated)  Tubal Ligation:  no    History:  History reviewed. No pertinent past medical history.  OB History    Para Term  AB Living   3 3 2     3   SAB TAB Ectopic Molar Multiple Live Births           1 3      # Outcome Date GA Lbr Rito/2nd Weight Sex Delivery Anes PTL Lv   3 Term 20 38w6d / 00:09 3.5 kg (7 lb 11.5 oz) M Vag-Spont EPI N NAZ   2 Term 2019     Vag-Spont   NAZ   1A Term 07/12/15 39w6d  4.03 kg (8 lb 14.2 oz) M Vag-Spont EPI N NAZ   1B                  Patient has no known allergies.  There are no active problems to display for this patient.       Hospital Course:   25 y.o. , now para 3, was admitted with the above mentioned diagnosis, underwent Active Labor, vaginal, spontaneous. Patient postpartum course was unremarkable, with progressive advancement in diet , ambulation and toleration of oral analgesia. Patient without complaints today and desires discharge.      Vitals:    20 1000 20 1800 20 0600 20 0600   BP: 105/61 114/73 125/81 101/64   Pulse: 64 72 64 (!) 56   Resp: 15 17 16 17   Temp: 36.2 °C (97.2 °F) 36.7 °C (98 °F) 36.7 °C (98 °F) 36.8 °C (98.2 °F)   TempSrc: Temporal Temporal Temporal Temporal   SpO2: 91% 94% 92% 98%   Weight:       Height:           Current Facility-Administered Medications   Medication Dose   • ferrous sulfate tablet 325 mg  325 mg   • ondansetron (ZOFRAN ODT) dispertab 4 mg  4 mg    Or   • ondansetron (ZOFRAN) syringe/vial injection 4 mg  4 mg   • oxytocin (PITOCIN) infusion (for postpartum)   mL/hr   • ibuprofen (MOTRIN) tablet 600 mg  600 mg   • oxyCODONE-acetaminophen (PERCOCET) 5-325 MG per tablet 1 Tab  1 Tab    • oxyCODONE-acetaminophen (PERCOCET) 5-325 MG per tablet 2 Tab  2 Tab   • LR infusion     • tetanus-dipth-acell pertussis (Tdap) inj 0.5 mL  0.5 mL   • measles, mumps and rubella vaccine (MMR) injection 0.5 mL  0.5 mL       Exam:  Breast Exam: negative  Abdomen: Abdomen soft, non-tender. BS normal. No masses,  No organomegaly  Fundus Non Tender: yes  Perineum: perineum intact  Extremity: extremities, peripheral pulses and reflexes normal     Labs:  Recent Labs     09/24/20  1845 09/25/20  1638   WBC 11.3* 12.5*   RBC 4.29 3.87*   HEMOGLOBIN 11.1* 9.9*   HEMATOCRIT 34.8* 30.9*   MCV 81.1* 79.8*   MCH 25.9* 25.6*   MCHC 31.9* 32.0*   RDW 41.0 39.9   PLATELETCT 231 210   MPV 12.6 12.7        Activity:   Discharge to home  Pelvic Rest x 6 weeks    Assessment:  normal postpartum course  Discharge Assessment: Taking adequate diet and fluids     Follow up: Dr Arnel David 6 weeks      Discharge Meds:   No current outpatient medications on file.     Joann Albarran M.D.

## 2020-09-27 NOTE — PROGRESS NOTES
Assessment done. Pt. Has no c/o pain after medication.Fundus firm.Lochia light. Fob at bedside,supportive.Pt. Caring for baby with good skill. Breastfeeding going well.Pt. Has received information on education .plan of care for today discussed.

## 2020-10-13 ENCOUNTER — HOSPITAL ENCOUNTER (EMERGENCY)
Facility: MEDICAL CENTER | Age: 25
End: 2020-10-13
Attending: EMERGENCY MEDICINE
Payer: MEDICAID

## 2020-10-13 VITALS
HEIGHT: 65 IN | WEIGHT: 190.48 LBS | HEART RATE: 96 BPM | RESPIRATION RATE: 16 BRPM | DIASTOLIC BLOOD PRESSURE: 76 MMHG | OXYGEN SATURATION: 98 % | BODY MASS INDEX: 31.74 KG/M2 | TEMPERATURE: 98 F | SYSTOLIC BLOOD PRESSURE: 122 MMHG

## 2020-10-13 DIAGNOSIS — N64.4 BREAST PAIN: ICD-10-CM

## 2020-10-13 DIAGNOSIS — N61.0 MASTITIS: ICD-10-CM

## 2020-10-13 PROCEDURE — A9270 NON-COVERED ITEM OR SERVICE: HCPCS | Performed by: EMERGENCY MEDICINE

## 2020-10-13 PROCEDURE — 700102 HCHG RX REV CODE 250 W/ 637 OVERRIDE(OP): Performed by: EMERGENCY MEDICINE

## 2020-10-13 PROCEDURE — 99283 EMERGENCY DEPT VISIT LOW MDM: CPT

## 2020-10-13 RX ORDER — CEPHALEXIN 500 MG/1
500 CAPSULE ORAL 2 TIMES DAILY
Qty: 14 CAP | Refills: 0 | Status: SHIPPED | OUTPATIENT
Start: 2020-10-13 | End: 2020-10-20

## 2020-10-13 RX ORDER — CEPHALEXIN 500 MG/1
500 CAPSULE ORAL ONCE
Status: COMPLETED | OUTPATIENT
Start: 2020-10-13 | End: 2020-10-13

## 2020-10-13 RX ADMIN — CEPHALEXIN 500 MG: 500 CAPSULE ORAL at 03:15

## 2020-10-13 ASSESSMENT — PAIN DESCRIPTION - DESCRIPTORS: DESCRIPTORS: BURNING;PRESSURE

## 2020-10-13 NOTE — ED NOTES
Sujey charge RN from L&D called back stating pt should be able to get a prescription for breast pump through her OBGYN, pt updated and verbalized understanding.   Pt A&Ox4, given discharge instructions. Discussed follow-up, diet, activity, symptoms and management and prescriptions provided. Pt educated on hot compresses and warm showers/massaging to relief pressure/pain. Pt verbalized understanding of all discharge instructions. All questions answered. Pt ambulated out of ER, all belongings accounted for.

## 2020-10-13 NOTE — ED PROVIDER NOTES
ED Provider Note    Scribed for Socrates Page M.D. by Diallo Hunt. 10/13/2020  1:57 AM    Primary care provider: Pcp Pt States None  Means of arrival: Walk-in  History obtained from: Patient  History limited by: None    CHIEF COMPLAINT  Chief Complaint   Patient presents with   • Breast Pain     PT reports red swollwn area on right breast getting worse since yesterday.       HPI  Gayle Pagan is a 25 y.o. female who presents to the Emergency Department for evaluation of acute, worsening right sided breast pain onset earlier today. She is currently breast feeding, but has not had any different discharge or bleeding. She delivered a son vaginally without complications two weeks ago. She feeds every 2-3 hours, with her son only latching. He is exclusively breast fed. Her left breast has minimal pain compared to the right. She has also noticed swelling and redness to the breast. She had a fever of 101, but has not had cough, dysuria, or vaginal discharge. Her OBGYN is Dr. Arnel David.     REVIEW OF SYSTEMS  Pertinent positives include: breast pain, swelling, redness, and fever. Pertinent negatives include: no cough, dysuria, or vaginal discharge. See history of present illness. All other systems are negative.     PAST MEDICAL HISTORY       SURGICAL HISTORY   has a past surgical history that includes navarro by laparoscopy (1/16/2013).    SOCIAL HISTORY  Social History     Tobacco Use   • Smoking status: Never Smoker   • Smokeless tobacco: Never Used   Substance Use Topics   • Alcohol use: No   • Drug use: No      Social History     Substance and Sexual Activity   Drug Use No       FAMILY HISTORY  Family History   Problem Relation Age of Onset   • Diabetes Paternal Grandfather        CURRENT MEDICATIONS  Current Outpatient Medications   Medication Instructions   • Prenatal MV-Min-Fe Fum-FA-DHA (PRENATAL 1 PO) Take  by mouth.       ALLERGIES  No Known Allergies    PHYSICAL EXAM  VITAL SIGNS: /96   Pulse (!)  "109   Temp 36.8 °C (98.2 °F) (Temporal)   Resp 16   Ht 1.651 m (5' 5\")   Wt 86.4 kg (190 lb 7.6 oz)   SpO2 98%   BMI 31.70 kg/m²     Constitutional: Alert in no apparent distress.  HENT: No signs of trauma, Bilateral external ears normal, Nose normal. Uvula midline.   Eyes: Pupils are equal and reactive, Conjunctiva normal, Non-icteric.   Neck: Normal range of motion, No tenderness, Supple, No stridor.   Lymphatic: No lymphadenopathy noted.   Cardiovascular: Regular rate and rhythm, no murmurs.   Thorax & Lungs: Normal breath sounds, No respiratory distress, No wheezing, No chest tenderness.   Abdomen:  Soft, No tenderness, No peritoneal signs, No masses, No pulsatile masses.   Skin: Warm, Dry, Erythema to medial and inferior aspect of right breast  Back: No bony tenderness, No CVA tenderness.   Extremities: Intact distal pulses, No edema, No tenderness, No cyanosis.  Musculoskeletal: Good range of motion in all major joints. No tenderness to palpation or major deformities noted.   Neurologic: Alert , Normal motor function, Normal sensory function, No focal deficits noted.   Psychiatric: Affect normal, Judgment normal, Mood normal.     COURSE & MEDICAL DECISION MAKING  Nursing notes, VS, PMSFHx reviewed in chart.    25 y.o. female p/w chief complaint of right breast pain with redness and swelling onset earlier today.    1:57 AM Patient seen and examined at bedside. Given first dose of Keflex here. Informed her that she will be treated with antibiotics. She will need to get a breast pump in order to help with feeding her son. She will need to follow up in 48 hours. Discussed discharge instructions and return precautions with the patient and they were cleared for discharge. Patient was given the opportunity to ask any further questions.     I verified that the patient was wearing a mask and I was wearing appropriate PPE every time I entered the room. The patient's mask was on the patient at all times during my " encounter except for a brief view of the oropharynx.     The differential diagnoses include but are not limited to:   History and physical exam consistent with mastitis  No abscess  Treat with Keflex, safe for child per consultation with pharmacy  RN will speak with L&D for possible breast pump    The patient will return for new or worsening symptoms and is stable at the time of discharge.    The patient is referred to a primary physician for blood pressure management, diabetic screening, and for all other preventative health concerns.    DISPOSITION:  Patient will be discharged home in stable condition.    FOLLOW UP:  [facility] [place] [facility], Emergency Dept  [address] [facility] [id number] [phone]        [place] [address] [address] [place] [id number]  [phone]  In 2 days  For wound re-check      OUTPATIENT MEDICATIONS:  Discharge Medication List as of 10/13/2020  3:09 AM      START taking these medications    Details   cephALEXin (KEFLEX) 500 MG Cap Take 1 Cap by mouth 2 times a day for 7 days., Disp-14 Cap,R-0, Normal             FINAL IMPRESSION  1. Mastitis    2. Breast pain          Diallo REAVES (Jade), am scribing for, and in the presence of, Socrates Page M.D..    Electronically signed by: Diallo Hunt (Jade), 10/13/2020    Socrates REAVES M.D. personally performed the services described in this documentation, as scribed by Diallo Hunt in my presence, and it is both accurate and complete.    The note accurately reflects work and decisions made by me.  Socrates Page M.D.  10/13/2020  7:19 AM

## 2020-10-13 NOTE — ED TRIAGE NOTES
"Chief Complaint   Patient presents with   • Breast Pain     PT reports red swollwn area on right breast getting worse since yesterday.     Blood Pressure 144/96   Pulse (Abnormal) 109   Temperature 36.8 °C (98.2 °F) (Temporal)   Respiration 16   Height 1.651 m (5' 5\")   Weight 86.4 kg (190 lb 7.6 oz)   Oxygen Saturation 98%   Body Mass Index 31.70 kg/m²     "

## 2023-05-13 NOTE — ANESTHESIA POSTPROCEDURE EVALUATION
Patient: Gayle Pagan    Procedure Summary     Date: 09/24/20 Room / Location:     Anesthesia Start: 2048 Anesthesia Stop: 09/25/20 0104    Procedure: Labor Epidural Diagnosis:     Scheduled Providers:  Responsible Provider: Adán Brice M.D.    Anesthesia Type: epidural ASA Status: 2          Final Anesthesia Type: epidural  Last vitals  BP   Blood Pressure: 127/77    Temp   37.1 °C (98.8 °F)    Pulse   Pulse: 64   Resp   18    SpO2   96 %      Anesthesia Post Evaluation    Patient location during evaluation: PACU  Patient participation: complete - patient participated  Level of consciousness: awake and alert  Pain score: 0    Airway patency: patent  Anesthetic complications: no  Cardiovascular status: hemodynamically stable  Respiratory status: acceptable  Hydration status: euvolemic    PONV: none           Nurse Pain Score: 0 (NPRS)        
Epidural